# Patient Record
Sex: FEMALE | Race: WHITE | NOT HISPANIC OR LATINO | Employment: OTHER | URBAN - METROPOLITAN AREA
[De-identification: names, ages, dates, MRNs, and addresses within clinical notes are randomized per-mention and may not be internally consistent; named-entity substitution may affect disease eponyms.]

---

## 2018-02-15 ENCOUNTER — LAB REQUISITION (OUTPATIENT)
Dept: LAB | Facility: HOSPITAL | Age: 83
End: 2018-02-15

## 2018-02-15 DIAGNOSIS — Z00.00 ROUTINE GENERAL MEDICAL EXAMINATION AT A HEALTH CARE FACILITY: ICD-10-CM

## 2018-02-15 LAB
ANION GAP SERPL CALCULATED.3IONS-SCNC: 10.3 MMOL/L
BASOPHILS # BLD AUTO: 0.03 10*3/MM3 (ref 0–0.2)
BASOPHILS NFR BLD AUTO: 0.4 % (ref 0–1.5)
BUN BLD-MCNC: 26 MG/DL (ref 8–23)
BUN/CREAT SERPL: 25.2 (ref 7–25)
CALCIUM SPEC-SCNC: 8.2 MG/DL (ref 8.6–10.5)
CHLORIDE SERPL-SCNC: 99 MMOL/L (ref 98–107)
CO2 SERPL-SCNC: 27.7 MMOL/L (ref 22–29)
CREAT BLD-MCNC: 1.03 MG/DL (ref 0.57–1)
DEPRECATED RDW RBC AUTO: 56.5 FL (ref 37–54)
EOSINOPHIL # BLD AUTO: 0.18 10*3/MM3 (ref 0–0.7)
EOSINOPHIL NFR BLD AUTO: 2.1 % (ref 0.3–6.2)
ERYTHROCYTE [DISTWIDTH] IN BLOOD BY AUTOMATED COUNT: 14.8 % (ref 11.7–13)
GFR SERPL CREATININE-BSD FRML MDRD: 51 ML/MIN/1.73
GFR SERPL CREATININE-BSD FRML MDRD: 61 ML/MIN/1.73
GLUCOSE BLD-MCNC: 147 MG/DL (ref 65–99)
HCT VFR BLD AUTO: 32.2 % (ref 35.6–45.5)
HGB BLD-MCNC: 10.1 G/DL (ref 11.9–15.5)
IMM GRANULOCYTES # BLD: 0.02 10*3/MM3 (ref 0–0.03)
IMM GRANULOCYTES NFR BLD: 0.2 % (ref 0–0.5)
LYMPHOCYTES # BLD AUTO: 1.32 10*3/MM3 (ref 0.9–4.8)
LYMPHOCYTES NFR BLD AUTO: 15.5 % (ref 19.6–45.3)
MCH RBC QN AUTO: 32.9 PG (ref 26.9–32)
MCHC RBC AUTO-ENTMCNC: 31.4 G/DL (ref 32.4–36.3)
MCV RBC AUTO: 104.9 FL (ref 80.5–98.2)
MONOCYTES # BLD AUTO: 0.94 10*3/MM3 (ref 0.2–1.2)
MONOCYTES NFR BLD AUTO: 11.1 % (ref 5–12)
NEUTROPHILS # BLD AUTO: 6 10*3/MM3 (ref 1.9–8.1)
NEUTROPHILS NFR BLD AUTO: 70.7 % (ref 42.7–76)
PLATELET # BLD AUTO: 291 10*3/MM3 (ref 140–500)
PMV BLD AUTO: 10 FL (ref 6–12)
POTASSIUM BLD-SCNC: 5.1 MMOL/L (ref 3.5–5.2)
RBC # BLD AUTO: 3.07 10*6/MM3 (ref 3.9–5.2)
SODIUM BLD-SCNC: 137 MMOL/L (ref 136–145)
WBC NRBC COR # BLD: 8.49 10*3/MM3 (ref 4.5–10.7)

## 2018-02-15 PROCEDURE — 85025 COMPLETE CBC W/AUTO DIFF WBC: CPT

## 2018-02-15 PROCEDURE — 80048 BASIC METABOLIC PNL TOTAL CA: CPT

## 2019-08-12 ENCOUNTER — HOSPITAL ENCOUNTER (INPATIENT)
Facility: HOSPITAL | Age: 84
LOS: 4 days | Discharge: NURSING, REHAB OR LONG-TERM CARE | End: 2019-08-16
Attending: EMERGENCY MEDICINE | Admitting: HOSPITALIST

## 2019-08-12 ENCOUNTER — APPOINTMENT (OUTPATIENT)
Dept: GENERAL RADIOLOGY | Facility: HOSPITAL | Age: 84
End: 2019-08-12

## 2019-08-12 ENCOUNTER — APPOINTMENT (OUTPATIENT)
Dept: CT IMAGING | Facility: HOSPITAL | Age: 84
End: 2019-08-12

## 2019-08-12 DIAGNOSIS — S32.82XA MULTIPLE CLOSED FRACTURES OF PELVIS WITHOUT DISRUPTION OF PELVIC RING, INITIAL ENCOUNTER (HCC): Primary | ICD-10-CM

## 2019-08-12 DIAGNOSIS — R29.6 MULTIPLE FALLS: ICD-10-CM

## 2019-08-12 DIAGNOSIS — R62.7 FAILURE TO THRIVE IN ADULT: ICD-10-CM

## 2019-08-12 LAB
ABO GROUP BLD: NORMAL
ALBUMIN SERPL-MCNC: 2.9 G/DL (ref 3.5–5.2)
ALBUMIN/GLOB SERPL: 0.9 G/DL
ALP SERPL-CCNC: 109 U/L (ref 39–117)
ALT SERPL W P-5'-P-CCNC: 94 U/L (ref 1–33)
ANION GAP SERPL CALCULATED.3IONS-SCNC: 8.4 MMOL/L (ref 5–15)
AST SERPL-CCNC: 75 U/L (ref 1–32)
BASOPHILS # BLD AUTO: 0.03 10*3/MM3 (ref 0–0.2)
BASOPHILS NFR BLD AUTO: 0.3 % (ref 0–1.5)
BILIRUB SERPL-MCNC: 0.9 MG/DL (ref 0.2–1.2)
BLD GP AB SCN SERPL QL: NEGATIVE
BUN BLD-MCNC: 24 MG/DL (ref 8–23)
BUN/CREAT SERPL: 28.6 (ref 7–25)
CALCIUM SPEC-SCNC: 8.1 MG/DL (ref 8.6–10.5)
CHLORIDE SERPL-SCNC: 96 MMOL/L (ref 98–107)
CO2 SERPL-SCNC: 26.6 MMOL/L (ref 22–29)
CREAT BLD-MCNC: 0.84 MG/DL (ref 0.57–1)
DEPRECATED RDW RBC AUTO: 60.9 FL (ref 37–54)
EOSINOPHIL # BLD AUTO: 0.01 10*3/MM3 (ref 0–0.4)
EOSINOPHIL NFR BLD AUTO: 0.1 % (ref 0.3–6.2)
ERYTHROCYTE [DISTWIDTH] IN BLOOD BY AUTOMATED COUNT: 16 % (ref 12.3–15.4)
GFR SERPL CREATININE-BSD FRML MDRD: 64 ML/MIN/1.73
GLOBULIN UR ELPH-MCNC: 3.4 GM/DL
GLUCOSE BLD-MCNC: 87 MG/DL (ref 65–99)
HCT VFR BLD AUTO: 31.4 % (ref 34–46.6)
HGB BLD-MCNC: 9.8 G/DL (ref 12–15.9)
IMM GRANULOCYTES # BLD AUTO: 0.07 10*3/MM3 (ref 0–0.05)
IMM GRANULOCYTES NFR BLD AUTO: 0.6 % (ref 0–0.5)
INR PPP: 1.12 (ref 0.9–1.1)
LYMPHOCYTES # BLD AUTO: 0.57 10*3/MM3 (ref 0.7–3.1)
LYMPHOCYTES NFR BLD AUTO: 4.9 % (ref 19.6–45.3)
MCH RBC QN AUTO: 32.3 PG (ref 26.6–33)
MCHC RBC AUTO-ENTMCNC: 31.2 G/DL (ref 31.5–35.7)
MCV RBC AUTO: 103.6 FL (ref 79–97)
MONOCYTES # BLD AUTO: 1.18 10*3/MM3 (ref 0.1–0.9)
MONOCYTES NFR BLD AUTO: 10.1 % (ref 5–12)
NEUTROPHILS # BLD AUTO: 9.8 10*3/MM3 (ref 1.7–7)
NEUTROPHILS NFR BLD AUTO: 84 % (ref 42.7–76)
NRBC BLD AUTO-RTO: 0 /100 WBC (ref 0–0.2)
PLATELET # BLD AUTO: 291 10*3/MM3 (ref 140–450)
PMV BLD AUTO: 10.8 FL (ref 6–12)
POTASSIUM BLD-SCNC: 4.9 MMOL/L (ref 3.5–5.2)
PROT SERPL-MCNC: 6.3 G/DL (ref 6–8.5)
PROTHROMBIN TIME: 14.1 SECONDS (ref 11.7–14.2)
RBC # BLD AUTO: 3.03 10*6/MM3 (ref 3.77–5.28)
RH BLD: POSITIVE
SODIUM BLD-SCNC: 131 MMOL/L (ref 136–145)
T&S EXPIRATION DATE: NORMAL
WBC NRBC COR # BLD: 11.66 10*3/MM3 (ref 3.4–10.8)

## 2019-08-12 PROCEDURE — 71045 X-RAY EXAM CHEST 1 VIEW: CPT

## 2019-08-12 PROCEDURE — 73502 X-RAY EXAM HIP UNI 2-3 VIEWS: CPT

## 2019-08-12 PROCEDURE — 99284 EMERGENCY DEPT VISIT MOD MDM: CPT

## 2019-08-12 PROCEDURE — 70450 CT HEAD/BRAIN W/O DYE: CPT

## 2019-08-12 PROCEDURE — 80053 COMPREHEN METABOLIC PANEL: CPT | Performed by: EMERGENCY MEDICINE

## 2019-08-12 PROCEDURE — 86850 RBC ANTIBODY SCREEN: CPT | Performed by: EMERGENCY MEDICINE

## 2019-08-12 PROCEDURE — 86900 BLOOD TYPING SEROLOGIC ABO: CPT | Performed by: EMERGENCY MEDICINE

## 2019-08-12 PROCEDURE — 85610 PROTHROMBIN TIME: CPT | Performed by: EMERGENCY MEDICINE

## 2019-08-12 PROCEDURE — 86901 BLOOD TYPING SEROLOGIC RH(D): CPT | Performed by: EMERGENCY MEDICINE

## 2019-08-12 PROCEDURE — 85025 COMPLETE CBC W/AUTO DIFF WBC: CPT | Performed by: EMERGENCY MEDICINE

## 2019-08-12 RX ORDER — SODIUM CHLORIDE 0.9 % (FLUSH) 0.9 %
10 SYRINGE (ML) INJECTION AS NEEDED
Status: DISCONTINUED | OUTPATIENT
Start: 2019-08-12 | End: 2019-08-16 | Stop reason: HOSPADM

## 2019-08-13 PROBLEM — S32.9XXA PELVIC FRACTURE (HCC): Status: ACTIVE | Noted: 2019-08-13

## 2019-08-13 LAB
BACTERIA UR QL AUTO: ABNORMAL /HPF
BILIRUB UR QL STRIP: NEGATIVE
CLARITY UR: ABNORMAL
COLOR UR: YELLOW
GLUCOSE UR STRIP-MCNC: NEGATIVE MG/DL
HGB UR QL STRIP.AUTO: NEGATIVE
HYALINE CASTS UR QL AUTO: ABNORMAL /LPF
KETONES UR QL STRIP: ABNORMAL
LEUKOCYTE ESTERASE UR QL STRIP.AUTO: ABNORMAL
NITRITE UR QL STRIP: NEGATIVE
PH UR STRIP.AUTO: 8.5 [PH] (ref 5–8)
PROT UR QL STRIP: ABNORMAL
RBC # UR: ABNORMAL /HPF
REF LAB TEST METHOD: ABNORMAL
SP GR UR STRIP: 1.02 (ref 1–1.03)
SQUAMOUS #/AREA URNS HPF: ABNORMAL /HPF
UROBILINOGEN UR QL STRIP: ABNORMAL
WBC UR QL AUTO: ABNORMAL /HPF

## 2019-08-13 PROCEDURE — 81001 URINALYSIS AUTO W/SCOPE: CPT | Performed by: EMERGENCY MEDICINE

## 2019-08-13 PROCEDURE — 3E0G76Z INTRODUCTION OF NUTRITIONAL SUBSTANCE INTO UPPER GI, VIA NATURAL OR ARTIFICIAL OPENING: ICD-10-PCS | Performed by: HOSPITALIST

## 2019-08-13 PROCEDURE — G0378 HOSPITAL OBSERVATION PER HR: HCPCS

## 2019-08-13 PROCEDURE — 25010000002 HYDROMORPHONE PER 4 MG: Performed by: HOSPITALIST

## 2019-08-13 PROCEDURE — 92610 EVALUATE SWALLOWING FUNCTION: CPT

## 2019-08-13 RX ORDER — FLUOXETINE HYDROCHLORIDE 20 MG/1
20 CAPSULE ORAL DAILY
Status: DISCONTINUED | OUTPATIENT
Start: 2019-08-13 | End: 2019-08-16 | Stop reason: HOSPADM

## 2019-08-13 RX ORDER — NITROGLYCERIN 0.4 MG/1
0.4 TABLET SUBLINGUAL
COMMUNITY
End: 2019-08-16 | Stop reason: HOSPADM

## 2019-08-13 RX ORDER — FUROSEMIDE 20 MG/1
20 TABLET ORAL DAILY
Status: DISCONTINUED | OUTPATIENT
Start: 2019-08-13 | End: 2019-08-13

## 2019-08-13 RX ORDER — LEVOTHYROXINE SODIUM 0.07 MG/1
75 TABLET ORAL DAILY
Status: DISCONTINUED | OUTPATIENT
Start: 2019-08-13 | End: 2019-08-16 | Stop reason: HOSPADM

## 2019-08-13 RX ORDER — ALBUTEROL SULFATE 90 UG/1
2 AEROSOL, METERED RESPIRATORY (INHALATION) EVERY 6 HOURS PRN
COMMUNITY

## 2019-08-13 RX ORDER — ISOSORBIDE MONONITRATE 30 MG/1
30 TABLET, EXTENDED RELEASE ORAL DAILY
Status: DISCONTINUED | OUTPATIENT
Start: 2019-08-13 | End: 2019-08-13

## 2019-08-13 RX ORDER — AMITRIPTYLINE HYDROCHLORIDE 10 MG/1
10 TABLET, FILM COATED ORAL NIGHTLY
Status: DISCONTINUED | OUTPATIENT
Start: 2019-08-13 | End: 2019-08-16 | Stop reason: HOSPADM

## 2019-08-13 RX ORDER — PANTOPRAZOLE SODIUM 40 MG/1
40 TABLET, DELAYED RELEASE ORAL DAILY
Status: DISCONTINUED | OUTPATIENT
Start: 2019-08-13 | End: 2019-08-16 | Stop reason: HOSPADM

## 2019-08-13 RX ORDER — FUROSEMIDE 20 MG/1
20 TABLET ORAL DAILY
COMMUNITY
End: 2019-08-16 | Stop reason: HOSPADM

## 2019-08-13 RX ORDER — SODIUM CHLORIDE 9 MG/ML
75 INJECTION, SOLUTION INTRAVENOUS CONTINUOUS
Status: DISCONTINUED | OUTPATIENT
Start: 2019-08-13 | End: 2019-08-14

## 2019-08-13 RX ORDER — HYDROMORPHONE HYDROCHLORIDE 1 MG/ML
0.5 INJECTION, SOLUTION INTRAMUSCULAR; INTRAVENOUS; SUBCUTANEOUS EVERY 4 HOURS PRN
Status: DISCONTINUED | OUTPATIENT
Start: 2019-08-13 | End: 2019-08-13

## 2019-08-13 RX ORDER — BUSPIRONE HYDROCHLORIDE 10 MG/1
10 TABLET ORAL 2 TIMES DAILY
Status: DISCONTINUED | OUTPATIENT
Start: 2019-08-13 | End: 2019-08-13

## 2019-08-13 RX ORDER — BUSPIRONE HYDROCHLORIDE 10 MG/1
10 TABLET ORAL 3 TIMES DAILY PRN
Status: DISCONTINUED | OUTPATIENT
Start: 2019-08-13 | End: 2019-08-16 | Stop reason: HOSPADM

## 2019-08-13 RX ORDER — BUSPIRONE HYDROCHLORIDE 10 MG/1
10 TABLET ORAL 2 TIMES DAILY
COMMUNITY

## 2019-08-13 RX ORDER — OXYCODONE HCL 20 MG/ML
5 CONCENTRATE, ORAL ORAL EVERY 4 HOURS PRN
Status: DISCONTINUED | OUTPATIENT
Start: 2019-08-13 | End: 2019-08-13

## 2019-08-13 RX ORDER — ALBUTEROL SULFATE 2.5 MG/3ML
2.5 SOLUTION RESPIRATORY (INHALATION) EVERY 6 HOURS PRN
Status: DISCONTINUED | OUTPATIENT
Start: 2019-08-13 | End: 2019-08-16 | Stop reason: HOSPADM

## 2019-08-13 RX ORDER — ASPIRIN 81 MG/1
81 TABLET ORAL DAILY
Status: DISCONTINUED | OUTPATIENT
Start: 2019-08-13 | End: 2019-08-16 | Stop reason: HOSPADM

## 2019-08-13 RX ORDER — FLUOXETINE HYDROCHLORIDE 20 MG/1
20 CAPSULE ORAL DAILY
COMMUNITY

## 2019-08-13 RX ORDER — AMITRIPTYLINE HYDROCHLORIDE 10 MG/1
10 TABLET, FILM COATED ORAL NIGHTLY
COMMUNITY

## 2019-08-13 RX ORDER — LEVOTHYROXINE SODIUM 0.07 MG/1
75 TABLET ORAL DAILY
COMMUNITY

## 2019-08-13 RX ORDER — OXYCODONE HCL 5 MG/5 ML
5 SOLUTION, ORAL ORAL EVERY 4 HOURS PRN
Status: DISCONTINUED | OUTPATIENT
Start: 2019-08-13 | End: 2019-08-16 | Stop reason: HOSPADM

## 2019-08-13 RX ADMIN — HYDROMORPHONE HYDROCHLORIDE 0.5 MG: 1 INJECTION, SOLUTION INTRAMUSCULAR; INTRAVENOUS; SUBCUTANEOUS at 03:49

## 2019-08-13 RX ADMIN — ASPIRIN 81 MG: 81 TABLET, COATED ORAL at 15:27

## 2019-08-13 RX ADMIN — PANTOPRAZOLE SODIUM 40 MG: 40 TABLET, DELAYED RELEASE ORAL at 15:30

## 2019-08-13 RX ADMIN — FLUOXETINE HYDROCHLORIDE 20 MG: 20 CAPSULE ORAL at 15:26

## 2019-08-13 RX ADMIN — METOPROLOL TARTRATE 12.5 MG: 25 TABLET ORAL at 15:26

## 2019-08-13 RX ADMIN — LEVOTHYROXINE SODIUM 75 MCG: 75 TABLET ORAL at 15:26

## 2019-08-13 RX ADMIN — AMITRIPTYLINE HYDROCHLORIDE 10 MG: 10 TABLET, FILM COATED ORAL at 20:16

## 2019-08-13 RX ADMIN — HYDROMORPHONE HYDROCHLORIDE 0.5 MG: 1 INJECTION, SOLUTION INTRAMUSCULAR; INTRAVENOUS; SUBCUTANEOUS at 11:52

## 2019-08-13 RX ADMIN — METOPROLOL TARTRATE 12.5 MG: 25 TABLET ORAL at 20:16

## 2019-08-13 RX ADMIN — SODIUM CHLORIDE 75 ML/HR: 9 INJECTION, SOLUTION INTRAVENOUS at 19:18

## 2019-08-13 RX ADMIN — OXYCODONE HYDROCHLORIDE 5 MG: 5 SOLUTION ORAL at 20:16

## 2019-08-13 NOTE — NURSING NOTE
"Explained to patient that a urine sample is needed and explained in and out cath. Pt stated \" I will wait till later and then I might \".  "

## 2019-08-13 NOTE — PROGRESS NOTES
Continued Stay Note  [facility]     Patient Name: Mary Lindsey  MRN: [id number]  Today's Date: 8/13/2019    Admit Date: 8/12/2019    Discharge Plan     Row Name 08/13/19 1546       Plan    Plan Comments  Attempted calling pts daughter to discuss d/c planning. No answer, CCP to f/u 8/14.        Discharge Codes    No documentation.             Mckayla Foss RN

## 2019-08-13 NOTE — ED TRIAGE NOTES
To ER via EMS.  Fall at home.  Frequent falls frequently.  C/o rt hip pain. Hit back of head during fall.  No obvious injury to head.  Rt leg is shortened and slightly externally rotated.  + pulses.

## 2019-08-13 NOTE — PLAN OF CARE
"Problem: Patient Care Overview  Goal: Plan of Care Review   08/13/19 8557   Coping/Psychosocial   Plan of Care Reviewed With patient   OTHER   Outcome Summary Order received due to \"failed RN swallow screen\". Patient reportedly on PEG tube feedings and clear liquids at home, patient has clear liquid diet order. Trialed only clear liquids, no difficulty noted. May continue clear liquid diet, precautions - as upright as possible, meds through PEG tube. Speech to sign off at this time.         "

## 2019-08-13 NOTE — H&P
History and physical    Primary care physician  Dr. Justin    Chief complaint  Status post fall  Bilateral hip and pelvic pain    History of present illness  88-year-old white female with history of COPD CHF coronary artery disease hypertension hyperlipidemia hypothyroidism also have dementia with severe osteoarthritis osteoporosis who was a nursing home resident brought to the emergency room after mechanical fall yesterday with bilateral hip pain and pelvic pain.  Patient stated that she has been falling a lot lately without losing consciousness but mainly she lost her balance and fell.  Patient denies any chest pain increased shortness of breath dizziness.  Patient hurting more on the left hip than the right.  Patient work-up in ER revealed multiple pelvic fractures admit for management    PAST MEDICAL HISTORY  • Arthritis     • CHF (congestive heart failure) (CMS/[facility])     • COPD (chronic obstructive pulmonary disease) (CMS/[facility])     • Coronary artery disease     • Dementia     • Depression     • Disease of thyroid gland     • GERD (gastroesophageal reflux disease)     • Hyperlipidemia     • Hypertension     • Pneumonia        PAST SURGICAL HISTORY  Surgical History         Past Surgical History:   Procedure Laterality Date   • CARDIAC SURGERY         stents x 2   • GTUBE INSERTION       • JOINT REPLACEMENT Bilateral       hip   • NASAL SINUS SURGERY             FAMILY HISTORY  History reviewed. No pertinent family history.     SOCIAL HISTORY  Social History   Social History            Socioeconomic History   • Marital status:        Spouse name: Not on file   • Number of children: Not on file   • Years of education: Not on file   • Highest education level: Not on file   Tobacco Use   • Smoking status: Never Smoker   • Smokeless tobacco: Never Used   Substance and Sexual Activity   • Alcohol use: No       Frequency: Never   • Drug use: No         ALLERGIES  Patient has no known allergies.  Nursing Home  "medications reviewed     REVIEW OF SYSTEMS  Poor historian  Constitutional: Positive: fall   Musculoskeletal: Positive for arthralgias (bilateral hip pain).      PHYSICAL EXAM  Blood pressure 152/61, pulse 67, temperature 97.8 °F (36.6 °C), temperature source Oral, resp. rate 16, height 175.3 cm (69\"), weight 48 kg (105 lb 13.1 oz), SpO2 91 %.    GENERAL: Chronically ill-appearing, disheveled with multiple bruises on her extremities  HENT: NCAT, neck supple, trachea midline  EYES: no scleral icterus, PERRL, normal conjunctiva  CV: regular rhythm, regular rate, no murmur  RESPIRATORY: unlabored effort, CTAB  ABDOMEN: soft, non-tender, non-distended, bowel sounds present.  There is a G-tube present with no signs of infection or leakage.  MUSCULOSKELETAL: no gross deformity, TTP mild over R hip, slight pain with internal and external rotation.patient was unable to ambulate on her own even with a walker.  The pain in the right hip contributed to some unstable weightbearing and she had to be supported fully by one other person   nEURO: alert,  sensory and motor function of extremities grossly intact, speech clear, mental status normal/baseline. Pt is oriented but inaccurate with some answers and repeatative  SKIN: warm, dry, no rash, multiple bruising to the BLE.   PSYCH:  Appropriate mood and affect    LAB RESULTS  Lab Results (last 24 hours)     Procedure Component Value Units Date/Time    Comprehensive Metabolic Panel [[id number]]  (Abnormal) Collected:  08/12/19 2209    Specimen:  Blood Updated:  08/12/19 2256     Glucose 87 mg/dL      BUN 24 mg/dL      Creatinine 0.84 mg/dL      Sodium 131 mmol/L      Potassium 4.9 mmol/L      Comment: Specimen hemolyzed.  Results may be affected.        Chloride 96 mmol/L      CO2 26.6 mmol/L      Calcium 8.1 mg/dL      Total Protein 6.3 g/dL      Albumin 2.90 g/dL      ALT (SGPT) 94 U/L      Comment: Specimen hemolyzed.  Results may be affected.        AST (SGOT) 75 U/L      " Comment: Specimen hemolyzed.  Results may be affected.        Alkaline Phosphatase 109 U/L      Total Bilirubin 0.9 mg/dL      eGFR Non African Amer 64 mL/min/1.73      Globulin 3.4 gm/dL      A/G Ratio 0.9 g/dL      BUN/Creatinine Ratio 28.6     Anion Gap 8.4 mmol/L     Narrative:       GFR Normal >60  Chronic Kidney Disease <60  Kidney Failure <15    Protime-INR [[id number]]  (Abnormal) Collected:  08/12/19 2209    Specimen:  Blood Updated:  08/12/19 2242     Protime 14.1 Seconds      INR 1.12    CBC & Differential [[id number]] Collected:  08/12/19 2208    Specimen:  Blood Updated:  08/12/19 2240    Narrative:       The following orders were created for panel order CBC & Differential.  Procedure                               Abnormality         Status                     ---------                               -----------         ------                     CBC Auto Differential[[id number]]        Abnormal            Final result                 Please view results for these tests on the individual orders.    CBC Auto Differential [[id number]]  (Abnormal) Collected:  08/12/19 2208    Specimen:  Blood Updated:  08/12/19 2240     WBC 11.66 10*3/mm3      RBC 3.03 10*6/mm3      Hemoglobin 9.8 g/dL      Hematocrit 31.4 %      .6 fL      MCH 32.3 pg      MCHC 31.2 g/dL      RDW 16.0 %      RDW-SD 60.9 fl      MPV 10.8 fL      Platelets 291 10*3/mm3      Neutrophil % 84.0 %      Lymphocyte % 4.9 %      Monocyte % 10.1 %      Eosinophil % 0.1 %      Basophil % 0.3 %      Immature Grans % 0.6 %      Neutrophils, Absolute 9.80 10*3/mm3      Lymphocytes, Absolute 0.57 10*3/mm3      Monocytes, Absolute 1.18 10*3/mm3      Eosinophils, Absolute 0.01 10*3/mm3      Basophils, Absolute 0.03 10*3/mm3      Immature Grans, Absolute 0.07 10*3/mm3      nRBC 0.0 /100 WBC         Imaging Results (last 24 hours)     Procedure Component Value Units Date/Time    CT Head Without Contrast [[id number]] Collected:  08/12/19 2235     Updated:   08/12/19 2240    Narrative:       CRANIAL CT SCAN WITHOUT CONTRAST     CLINICAL HISTORY: chi     COMPARISON: None.     TECHNIQUE: Radiation dose reduction techniques were utilized, including  automated exposure control and exposure modulation based on body size.  Multiple axial images of the head were obtained without contrast.      FINDINGS:  There are no abnormal areas of increased density or mass  effect. Advanced atrophy. There are mild scattered areas of decreased  density in the white matter likely related to chronic ischemic gliotic  changes.   Ventricles, sulci, and cisterns appear normal. Bone window  images are unremarkable.                Impression:       1. No acute intracranial abnormality.                 This report was finalized on 8/12/2019 10:36 PM by Shailesh Alfaro M.D.       XR Hip With or Without Pelvis 2 - 3 View Right [[id number]] Collected:  08/12/19 2148     Updated:  08/12/19 2154    Narrative:       Pelvis and right hip x-rays     HISTORY: Fall, right hip pain.     TECHNIQUE: An AP view the pelvis, two images of the right hip and an AP  view of the left hip are provided and are correlated with pelvis and  right hip x-rays from 06/16/2016.     FINDINGS: There our bilateral, bipolar hip arthroplasties. There is  cortical irregularity along the superior aspect of the right pubic bone,  the midportion of the right inferior ischiopubic ramus, and the mid  inferior left ischiopubic ramus. These are consistent with fractures of  unknown acuity. The sacrum is partly obscured by fecal material and  bowel gas. No periprosthetic femur fracture is present. There is  deformity at the left femoral lesser trochanter, compatible with an old  injury to this location. At the upper edge of the field-of-view,  advanced degenerative change in the lumbar spine with scoliotic  curvature is observed along with a G-tube.       Impression:       Bipolar hip arthroplasties bilaterally. Cortical  irregularity of the  ischio pubic rami bilaterally are compatible with  pelvic fractures although their exact age is not ascertained. These are  new since 2006. There is no evidence of femur fracture.     This report was finalized on 8/12/2019 9:51 PM by Dr. Brendan Stewart M.D.       XR Chest 1 View [[id number]] Collected:  08/12/19 2146     Updated:  08/12/19 2151    Narrative:       PORTABLE CHEST X-RAY     HISTORY: fall     COMPARISON: None.     FINDINGS: Portable AP view of the chest was obtained. The lungs are well  inflated. There are some scattered fibrotic appearing opacities which  are favored to be chronic. Linear densities in the left lower lobe most  suggestive of scarring or atelectasis. There is no convincing evidence  of active air space disease process otherwise. No edema or pleural  fluid. Borderline cardiomegaly. Mild aortic ectasia. There are multiple  old appearing bilateral rib deformities.             Impression:       Chronic-appearing parenchymal changes with some linear scar  and/or atelectasis in the left lower lobe.     This report was finalized on 8/12/2019 9:48 PM by Shailesh Alfaro M.D.             Current Facility-Administered Medications:   •  albuterol (PROVENTIL) nebulizer solution 0.083% 2.5 mg/3mL, 2.5 mg, Nebulization, Q6H PRN, Melecio Levi MD  •  amitriptyline (ELAVIL) tablet 10 mg, 10 mg, Per G Tube, Nightly, Melecio Levi MD  •  aspirin EC tablet 81 mg, 81 mg, Oral, Daily, Melecio Levi MD  •  busPIRone (BUSPAR) tablet 10 mg, 10 mg, Per G Tube, TID PRN, Melecio Levi MD  •  FLUoxetine (PROzac) capsule 20 mg, 20 mg, Per G Tube, Daily, Melecio Levi MD  •  furosemide (LASIX) tablet 20 mg, 20 mg, Per G Tube, Daily, Melecio Levi MD  •  HYDROmorphone (DILAUDID) injection 0.5 mg, 0.5 mg, Intravenous, Q4H PRN, Melecio Levi MD, 0.5 mg at 08/13/19 1152  •  isosorbide mononitrate (IMDUR) 24 hr tablet 30 mg, 30 mg, Oral, Daily, Melecio Levi MD  •  levothyroxine (SYNTHROID, LEVOTHROID) tablet 75 mcg, 75  mcg, Per G Tube, Daily, Mitchell Prieto MD  •  metoprolol tartrate (LOPRESSOR) tablet 12.5 mg, 12.5 mg, Per G Tube, Q12H, Mitchell Prieto MD  •  oxyCODONE (ROXICODONE) 100 MG/5ML concentrated solution 5 mg, 5 mg, Oral, Q4H PRN, Mitchell Prieto MD  •  pantoprazole (PROTONIX) EC tablet 40 mg, 40 mg, Oral, Daily, Mitchell Prieto MD  •  [COMPLETED] Insert peripheral IV, , , Once **AND** sodium chloride 0.9 % flush 10 mL, 10 mL, Intravenous, PRN, Darius Esteves MD     ASSESSMENT  Multiple pelvic fractures  Status post fall  Bilateral hip pain with endoprosthesis in the past  COPD  Congestive heart failure  Coronary artery disease  Anxiety disorder  Depression  Dementia  Hypertension   Hyperlipidemia  Hypothyroidism  Osteoarthritis  Osteoporosis    PLAN  Admit   Bedrest  Pain management  Orthopedic surgery consult  Continue nursing home medications and adjust the doses  Stress ulcer DVT prophylaxis  Supportive care  DNR  PT OT  Follow closely and further recommendations according to hospital course    MITCHELL PRIETO MD

## 2019-08-13 NOTE — THERAPY EVALUATION
Acute Care - Speech Language Pathology   Swallow Initial Evaluation [facility]     Patient Name: Mary Lindsey  : 1930  MRN: [id number]  Today's Date: 2019               Admit Date: 2019    Visit Dx:     ICD-10-CM ICD-9-CM   1. Multiple closed fractures of pelvis without disruption of pelvic ring, initial encounter (CMS/[facility]) S32.82XA 808.44   2. Failure to thrive in adult R62.7 783.7   3. Multiple falls R29.6 V15.88     Patient Active Problem List   Diagnosis   • Pelvic fracture (CMS/[facility])     Past Medical History:   Diagnosis Date   • Arthritis    • CHF (congestive heart failure) (CMS/[facility])    • COPD (chronic obstructive pulmonary disease) (CMS/[facility])    • Coronary artery disease    • Dementia    • Depression    • Disease of thyroid gland    • GERD (gastroesophageal reflux disease)    • Hyperlipidemia    • Hypertension    • Pneumonia     aspiration     Past Surgical History:   Procedure Laterality Date   • CARDIAC SURGERY      stents x 2   • GTUBE INSERTION     • JOINT REPLACEMENT Bilateral     hip   • NASAL SINUS SURGERY          SWALLOW EVALUATION (last 72 hours)      SLP Adult Swallow Evaluation     Row Name 19 1000                   Rehab Evaluation    Document Type  evaluation  -MT        Subjective Information  no complaints  -MT        Patient Observations  alert;cooperative  -MT        Patient Effort  adequate  -MT           General Information    Patient Profile Reviewed  yes  -MT        Pertinent History Of Current Problem  fall, hip pain, failure to thrive. On PEG, per daughter receives PEG tube feedings and clear liquids (as reported in the chart).   -MT        Current Method of Nutrition  gastrostomy feedings;thin liquids  -MT        Precautions/Limitations, Vision  WFL;for purposes of eval  -MT        Precautions/Limitations, Hearing  WFL;for purposes of eval  -MT        Prior Level of Function-Communication  cognitive-linguistic impairment  -MT        Prior Level of  Function-Swallowing  alternative feeding method;thin liquids  -MT        Plans/Goals Discussed with  patient;other (see comments) family not available  -MT        Barriers to Rehab  none identified  -MT        Patient's Goals for Discharge  patient could not state  -MT           Oral Musculature and Cranial Nerve Assessment    Oral Motor General Assessment  WFL  -MT           General Eating/Swallowing Observations    Respiratory Support Currently in Use  room air  -MT        Eating/Swallowing Skills  self-fed  -MT        Positioning During Eating  upright in bed;semi-reclined  -MT        Utensils Used  cup  -MT        Consistencies Trialed  thin liquids  -MT           Clinical Swallow Eval    Clinical Swallow Evaluation Summary  Unknown why patient is on clear liquids, no documentation in chart except statement that daughter reports PEG feeding and clear liquids. Trialed clear liquids only. Some discoordination noted as pateint was not fully upright due to hip pain, but no overt signs of dysphagia with clear liquids.   -MT           Clinical Impression    SLP Swallowing Diagnosis  functional oral phase;functional pharyngeal phase;other (see comments) only trialed clear liquids  -MT        Functional Impact  no impact on function  -MT        Rehab Potential/Prognosis, Swallowing  good, to achieve stated therapy goals  -MT        Swallow Criteria for Skilled Therapeutic Interventions Met  no problems identified which require skilled intervention  -MT           Recommendations    Therapy Frequency (Swallow)  evaluation only  -MT        SLP Diet Recommendation  other (see comments) continue diet as ordered  -MT        Recommended Precautions and Strategies  upright posture during/after eating  -MT        SLP Rec. for Method of Medication Administration  meds via alternate route  -MT        Monitor for Signs of Aspiration  yes;notify SLP if any concerns  -MT        Anticipated Dischage Disposition  extended care facility  " -MT          User Key  (r) = Recorded By, (t) = Taken By, (c) = Cosigned By    Initials Name Effective Dates    Mariangel Roldan MS CCC-SLP 06/08/18 -           EDUCATION  The patient has been educated in the following areas:   Modified Diet Instruction.    SLP Recommendation and Plan  SLP Swallowing Diagnosis: functional oral phase, functional pharyngeal phase, other (see comments)(only trialed clear liquids)  SLP Diet Recommendation: other (see comments)(continue diet as ordered)  Recommended Precautions and Strategies: upright posture during/after eating  SLP Rec. for Method of Medication Administration: meds via alternate route     Monitor for Signs of Aspiration: yes, notify SLP if any concerns     Swallow Criteria for Skilled Therapeutic Interventions Met: no problems identified which require skilled intervention  Anticipated Dischage Disposition: extended care facility  Rehab Potential/Prognosis, Swallowing: good, to achieve stated therapy goals  Therapy Frequency (Swallow): evaluation only     Plan of Care Reviewed With: patient  Plan of Care Review  Plan of Care Reviewed With: patient  Outcome Summary: Order received due to \"failed RN swallow screen\". Patient reportedly on PEG tube feedings and clear liquids at home, patient has clear liquid diet order. Trialed only clear liquids, no difficulty noted. May continue clear liquid diet, precautions - as upright as possible, meds through PEG tube. Speech to sign off at this time.         SLP Outcome Measures (last 72 hours)      SLP Outcome Measures     Row Name 08/13/19 1000             SLP Outcome Measures    Outcome Measure Used?  Adult NOMS  -MT         Adult FCM Scores    FCM Chosen  Swallowing  -MT      Swallowing FCM Score  3  -MT        User Key  (r) = Recorded By, (t) = Taken By, (c) = Cosigned By    Initials Name Effective Dates    Mariangel Roldan MS CCC-SLP 06/08/18 -            Time Calculation:   Time Calculation- SLP     Row Name 08/13/19 " 1049             Time Calculation- SLP    SLP Start Time  1030  -MT      SLP Stop Time  1050  -MT      SLP Time Calculation (min)  20 min  -MT      SLP Received On  08/13/19  -MT        User Key  (r) = Recorded By, (t) = Taken By, (c) = Cosigned By    Initials Name Provider Type    Mariangel Roldan MS CCC-SLP Speech and Language Pathologist          Therapy Charges for Today     Code Description Service Date Service Provider Modifiers Qty    [id number] HC ST EVAL ORAL PHARYNG SWALLOW 1 8/13/2019 Mariangel Obrien MS CCC-SLP GN 1               Mariangel Obrien MS CCC-AUDNREA  8/13/2019

## 2019-08-13 NOTE — ED PROVIDER NOTES
EMERGENCY DEPARTMENT ENCOUNTER    Room Number:  19/19  Date of encounter:  8/13/2019  PCP: Provider, No Known  Historian: Patient      HPI:  Chief Complaint: Fall  A complete HPI/ROS/PMH/PSH/SH/FH are unobtainable due to: poor historian    Context: Mary Lindsey is a 88 y.o. female who presents to the ED c/o a mechanical fall earlier this evening. Pt admits to bilateral hip pain. Pt states that she has been falling a lot and associates it with losing her balance. She reports that she was not using her walker to ambulate when she fell earlier today. She says that she did spend a lot of time on the floor when she fell today as she does not live in a NH but with her daughter. Pt reports hx of RA.      PAST MEDICAL HISTORY  Active Ambulatory Problems     Diagnosis Date Noted   • No Active Ambulatory Problems     Resolved Ambulatory Problems     Diagnosis Date Noted   • No Resolved Ambulatory Problems     Past Medical History:   Diagnosis Date   • Arthritis    • CHF (congestive heart failure) (CMS/[facility])    • COPD (chronic obstructive pulmonary disease) (CMS/[facility])    • Coronary artery disease    • Dementia    • Depression    • Disease of thyroid gland    • GERD (gastroesophageal reflux disease)    • Hyperlipidemia    • Hypertension    • Pneumonia          PAST SURGICAL HISTORY  Past Surgical History:   Procedure Laterality Date   • CARDIAC SURGERY      stents x 2   • GTUBE INSERTION     • JOINT REPLACEMENT Bilateral     hip   • NASAL SINUS SURGERY           FAMILY HISTORY  History reviewed. No pertinent family history.      SOCIAL HISTORY  Social History     Socioeconomic History   • Marital status:      Spouse name: Not on file   • Number of children: Not on file   • Years of education: Not on file   • Highest education level: Not on file   Tobacco Use   • Smoking status: Never Smoker   • Smokeless tobacco: Never Used   Substance and Sexual Activity   • Alcohol use: No     Frequency: Never   • Drug use: No          ALLERGIES  Patient has no known allergies.        REVIEW OF SYSTEMS  Review of Systems   Unable to perform ROS: Other (Poor historian)   Constitutional:        Positive: fall   Musculoskeletal: Positive for arthralgias (bilateral hip pain).            PHYSICAL EXAM    I have reviewed the triage vital signs and nursing notes.    ED Triage Vitals [08/12/19 2028]   Temp Heart Rate Resp BP SpO2   98.1 °F (36.7 °C) 60 19 158/68 91 %      Temp src Heart Rate Source Patient Position BP Location FiO2 (%)   -- -- -- -- --       GENERAL: Chronically ill-appearing, disheveled with multiple bruises on her extremities  HENT: NCAT, neck supple, trachea midline  EYES: no scleral icterus, PERRL, normal conjunctiva  CV: regular rhythm, regular rate, no murmur  RESPIRATORY: unlabored effort, CTAB  ABDOMEN: soft, non-tender, non-distended, bowel sounds present.  There is a G-tube present with no signs of infection or leakage.  MUSCULOSKELETAL: no gross deformity, TTP mild over R hip, slight pain with internal and external rotation.patient was unable to ambulate on her own even with a walker.  The pain in the right hip contributed to some unstable weightbearing and she had to be supported fully by one other person   nEURO: alert,  sensory and motor function of extremities grossly intact, speech clear, mental status normal/baseline. Pt is oriented but inaccurate with some answers and repeatative  SKIN: warm, dry, no rash, multiple bruising to the BLE.   PSYCH:  Appropriate mood and affect    Vital signs and nursing notes reviewed.          LAB RESULTS  Recent Results (from the past 24 hour(s))   CBC Auto Differential    Collection Time: 08/12/19 10:08 PM   Result Value Ref Range    WBC 11.66 (H) 3.40 - 10.80 10*3/mm3    RBC 3.03 (L) 3.77 - 5.28 10*6/mm3    Hemoglobin 9.8 (L) 12.0 - 15.9 g/dL    Hematocrit 31.4 (L) 34.0 - 46.6 %    .6 (H) 79.0 - 97.0 fL    MCH 32.3 26.6 - 33.0 pg    MCHC 31.2 (L) 31.5 - 35.7 g/dL    RDW  16.0 (H) 12.3 - 15.4 %    RDW-SD 60.9 (H) 37.0 - 54.0 fl    MPV 10.8 6.0 - 12.0 fL    Platelets 291 140 - 450 10*3/mm3    Neutrophil % 84.0 (H) 42.7 - 76.0 %    Lymphocyte % 4.9 (L) 19.6 - 45.3 %    Monocyte % 10.1 5.0 - 12.0 %    Eosinophil % 0.1 (L) 0.3 - 6.2 %    Basophil % 0.3 0.0 - 1.5 %    Immature Grans % 0.6 (H) 0.0 - 0.5 %    Neutrophils, Absolute 9.80 (H) 1.70 - 7.00 10*3/mm3    Lymphocytes, Absolute 0.57 (L) 0.70 - 3.10 10*3/mm3    Monocytes, Absolute 1.18 (H) 0.10 - 0.90 10*3/mm3    Eosinophils, Absolute 0.01 0.00 - 0.40 10*3/mm3    Basophils, Absolute 0.03 0.00 - 0.20 10*3/mm3    Immature Grans, Absolute 0.07 (H) 0.00 - 0.05 10*3/mm3    nRBC 0.0 0.0 - 0.2 /100 WBC   Comprehensive Metabolic Panel    Collection Time: 08/12/19 10:09 PM   Result Value Ref Range    Glucose 87 65 - 99 mg/dL    BUN 24 (H) 8 - 23 mg/dL    Creatinine 0.84 0.57 - 1.00 mg/dL    Sodium 131 (L) 136 - 145 mmol/L    Potassium 4.9 3.5 - 5.2 mmol/L    Chloride 96 (L) 98 - 107 mmol/L    CO2 26.6 22.0 - 29.0 mmol/L    Calcium 8.1 (L) 8.6 - 10.5 mg/dL    Total Protein 6.3 6.0 - 8.5 g/dL    Albumin 2.90 (L) 3.50 - 5.20 g/dL    ALT (SGPT) 94 (H) 1 - 33 U/L    AST (SGOT) 75 (H) 1 - 32 U/L    Alkaline Phosphatase 109 39 - 117 U/L    Total Bilirubin 0.9 0.2 - 1.2 mg/dL    eGFR Non African Amer 64 >60 mL/min/1.73    Globulin 3.4 gm/dL    A/G Ratio 0.9 g/dL    BUN/Creatinine Ratio 28.6 (H) 7.0 - 25.0    Anion Gap 8.4 5.0 - 15.0 mmol/L   Protime-INR    Collection Time: 08/12/19 10:09 PM   Result Value Ref Range    Protime 14.1 11.7 - 14.2 Seconds    INR 1.12 (H) 0.90 - 1.10   Type & Screen    Collection Time: 08/12/19 10:09 PM   Result Value Ref Range    ABO Type A     RH type Positive     Antibody Screen Negative     T&S Expiration Date 8/15/2019 11:59:59 PM        Ordered the above labs and independently reviewed the results.        RADIOLOGY  Ct Head Without Contrast    Result Date: 8/12/2019  CRANIAL CT SCAN WITHOUT CONTRAST  CLINICAL HISTORY:  chi  COMPARISON: None.  TECHNIQUE: Radiation dose reduction techniques were utilized, including automated exposure control and exposure modulation based on body size. Multiple axial images of the head were obtained without contrast.  FINDINGS:  There are no abnormal areas of increased density or mass effect. Advanced atrophy. There are mild scattered areas of decreased density in the white matter likely related to chronic ischemic gliotic changes.   Ventricles, sulci, and cisterns appear normal. Bone window images are unremarkable.         1. No acute intracranial abnormality.      This report was finalized on 8/12/2019 10:36 PM by Shailesh Alfaro M.D.      Xr Chest 1 View    Result Date: 8/12/2019  PORTABLE CHEST X-RAY  HISTORY: fall  COMPARISON: None.  FINDINGS: Portable AP view of the chest was obtained. The lungs are well inflated. There are some scattered fibrotic appearing opacities which are favored to be chronic. Linear densities in the left lower lobe most suggestive of scarring or atelectasis. There is no convincing evidence of active air space disease process otherwise. No edema or pleural fluid. Borderline cardiomegaly. Mild aortic ectasia. There are multiple old appearing bilateral rib deformities.        Chronic-appearing parenchymal changes with some linear scar and/or atelectasis in the left lower lobe.  This report was finalized on 8/12/2019 9:48 PM by Shailesh Alfaro M.D.      Xr Hip With Or Without Pelvis 2 - 3 View Right    Result Date: 8/12/2019  Pelvis and right hip x-rays  HISTORY: Fall, right hip pain.  TECHNIQUE: An AP view the pelvis, two images of the right hip and an AP view of the left hip are provided and are correlated with pelvis and right hip x-rays from 06/16/2016.  FINDINGS: There our bilateral, bipolar hip arthroplasties. There is cortical irregularity along the superior aspect of the right pubic bone, the midportion of the right inferior ischiopubic ramus, and the mid inferior left  ischiopubic ramus. These are consistent with fractures of unknown acuity. The sacrum is partly obscured by fecal material and bowel gas. No periprosthetic femur fracture is present. There is deformity at the left femoral lesser trochanter, compatible with an old injury to this location. At the upper edge of the field-of-view, advanced degenerative change in the lumbar spine with scoliotic curvature is observed along with a G-tube.      Bipolar hip arthroplasties bilaterally. Cortical irregularity of the ischio pubic rami bilaterally are compatible with pelvic fractures although their exact age is not ascertained. These are new since 2006. There is no evidence of femur fracture.  This report was finalized on 8/12/2019 9:51 PM by Dr. Brendan Stewart M.D.        I ordered the above noted radiological studies. Independently reviewed by me and discussed with radiologist.  See dictation above for official radiology interpretation.      PROCEDURES    Procedures        MEDICATIONS GIVEN IN ER    Medications   sodium chloride 0.9 % flush 10 mL (not administered)         PROGRESS, CONSULTS, MEDICAL DECISION MAKING  ED Course as of Aug 13 0137 Tue Aug 13, 2019   0125 Analysis of the data, reveals white blood cell count of 11, and hemoglobin 9.8.  These appear relatively stable.  The chemistry shows decreased albumin, volume depletion, hyponatremia, and slightly elevated liver enzymes.  Based on her clinical presentation this probably represents chronic malnutrition.  [DP]   0135 I did review the CT scan of the head as well as the imaging of the pelvis and discussed it with the radiologist.  The CT scan of the head shows nothing acute, and certainly no traumatic injury.  The pelvic x-ray is somewhat limited because of the bilateral prostheses and chronic arthritic changes.  However there are some subtle findings that suggest pubic rami fractures, certainly on the right and possibly on the left.  [DP]   0136 The nurse was able  to talk with the daughter on the phone who the patient normally lives with.  She does state that the living environment is not appropriate for her at this time, and that she is not able to help because of a bad back that she is receiving treatment for.  Because the patient's not able to ambulate under her own power, she will need to be admitted for further evaluation and treatment.  [DP]      ED Course User Index  [DP] Darius Esteves MD     0554- Initial encounter. The patient is resting comfortably and in NAD. D/w pt the lab and imaging results. Discussed the plan for ambulation test . Pt understands and agrees with the plan, all questions answered.          AS OF 1:37 AM VITALS:    BP - 158/68  HR - 60  TEMP - 98.1 °F (36.7 °C)  02 SATS - 91%        DIAGNOSIS  Final diagnoses:   Multiple closed fractures of pelvis without disruption of pelvic ring, initial encounter (CMS/[facility])   Failure to thrive in adult   Multiple falls         DISPOSITION  Admit          --  Documentation assistance provided by maria ines Waller for Dr. Esteves.  Information recorded by the maria ines was done at my direction and has been verified and validated by me.         Megan Waller  08/12/19 8034       Darius Esteves MD  08/13/19 8477

## 2019-08-13 NOTE — NURSING NOTE
Son-in-law (Grupo Hua) of patient in today to discuss his mom-in-laws care.  Patient resides with her daughter.  Daughter, Michelle Jeffrey, was concerned that her brother (Shaun Lindsey) may try to make medical decisions or seek information about her mother's care.  Michelle does not want her brother to make any decisions or to have any information regarding her plan of care released to him.  If brother attempts to make decisions or seeks medical information, Michelle would like the staff to refer her brother to her.  Michelle is also seeking to be the patient's POA. I informed Grupo that I would refer a  to his mother-in-law.  Michelle is also seeking long-time placement at a facility upon discharge.  I spoke with Mckayla ([facility]) and informed her of the situation.  Staff nurse, Kenia Valdes, made aware of my conversation with pt's son-in-law.

## 2019-08-13 NOTE — CONSULTS
Orthopedic Consult    Patient: Mary Lindsey                                           YOB: 1930     Date of Admission: 8/12/2019  8:33 PM            Medical Record Number: [id number]    Attending Physician: Melecio Levi MD    Consulting Physician: Tashia Pinto MD    Reason for Consult: Pelvic  Fractures.       History of Present Illness: 88 y.o. female admitted to [facility] with Pelvic fracture (CMS/HCC) [S32.9XXA]  Failure to thrive in adult [R62.7]  Multiple falls [R29.6]  Multiple closed fractures of pelvis without disruption of pelvic ring, initial encounter (CMS/HCC) [S32.82XA].    The patient was evaluated in the emergency room and was diagnosed with a pelvic fracture.   Secondary to the age and multiple medical co morbidities the patient was admitted to the hospitalist.   As I was on call for the emergency room I was consulted for further evaluation and treatment.   The patient was in the usual state of health and fell from standing height in her home, Resulting in sudden onset of bilateral groin pain and inability to ambulate. She states the pain is worse on the RIGHT than the LEFT.   Denies any history of loss of consciousness, headache, vomiting, or seizures.   Denies any other injuries.   The patient is accompanied by family members  to this hospital visit.   The patient has a history of bilateral total hip arthroplaties.    The patient uses a walker as assistive device prior to this fall.   The patient  lives at home with her daughter.  The patient has a history of dementia.    Patient denies any history of: DVT/PE, MRSA, Diabetes mellitus, or A-Fib.   The patient has history of : COPD, CHF, CAD, Dememtia, GERD, RA,  The patient is on not on anticoagulants.    Past medical history, past surgical history, social history, family history, ALLERGIES, current medications have been reviewed by me.    Past Medical History:   Diagnosis Date   • Arthritis    • CHF  (congestive heart failure) (CMS/HCC)    • COPD (chronic obstructive pulmonary disease) (CMS/[facility])    • Coronary artery disease    • Dementia    • Depression    • Disease of thyroid gland    • GERD (gastroesophageal reflux disease)    • Hyperlipidemia    • Hypertension    • Pneumonia     aspiration     Past Surgical History:   Procedure Laterality Date   • CARDIAC SURGERY      stents x 2   • GTUBE INSERTION     • JOINT REPLACEMENT Bilateral     hip   • NASAL SINUS SURGERY       Social History     Occupational History   • Not on file   Tobacco Use   • Smoking status: Never Smoker   • Smokeless tobacco: Never Used   Substance and Sexual Activity   • Alcohol use: No     Frequency: Never   • Drug use: No   • Sexual activity: Not on file      Social History     Social History Narrative   • Not on file     History reviewed. No pertinent family history.     No Known Allergies    Home Medications:  Medications Prior to Admission   Medication Sig Dispense Refill Last Dose   • albuterol sulfate  (90 Base) MCG/ACT inhaler Inhale 2 puffs Every 6 (Six) Hours As Needed for Wheezing.   8/12/2019 at Unknown time   • amitriptyline (ELAVIL) 10 MG tablet 10 mg by Per G Tube route Every Night.   Past Week at Unknown time   • aspirin 81 MG tablet 81 mg by Per G Tube route Daily.   8/12/2019 at Unknown time   • busPIRone (BUSPAR) 10 MG tablet 10 mg by Per G Tube route 2 (Two) Times a Day.   8/12/2019 at Unknown time   • FLUoxetine (PROzac) 20 MG capsule 20 mg by Per G Tube route Daily.   8/12/2019 at Unknown time   • furosemide (LASIX) 20 MG tablet 20 mg by Per G Tube route Daily.   8/12/2019 at Unknown time   • Isosorbide Mononitrate (IMDUR PO) 30 mg by Per G Tube route Daily.   8/12/2019 at Unknown time   • levothyroxine (SYNTHROID, LEVOTHROID) 75 MCG tablet 75 mcg by Per G Tube route Daily.   8/12/2019 at Unknown time   • metoprolol tartrate (LOPRESSOR) 25 MG tablet 12.5 mg by Per G Tube route 2 (Two) Times a Day.   8/12/2019 at  "Unknown time   • nitroglycerin (NITROSTAT) 0.4 MG SL tablet Place 0.4 mg under the tongue Every 5 (Five) Minutes As Needed for Chest Pain. Take no more than 3 doses in 15 minutes.   Past Week at Unknown time   • oxyCODONE HCl 15 MG tablet  15 mg by Per G Tube route Every 4 (Four) Hours As Needed (pain).   8/12/2019 at Unknown time   • Pantoprazole Sodium (PROTONIX PO) 40 mg by Per G Tube route Daily.   8/12/2019 at Unknown time       Current Medications:  Scheduled Meds:   Continuous Infusions:   PRN Meds:.•  HYDROmorphone  •  [COMPLETED] Insert peripheral IV **AND** sodium chloride    Review of Systems:   A 12 point system review was reviewed with the patient and from the chart  and is negative except as in history of present illness.      Physical Exam: 88 y.o. female                    Vitals:    08/12/19 2028 08/13/19 0237   BP: 158/68 153/64   BP Location:  Right arm   Patient Position:  Lying   Pulse: 60 61   Resp: 19 18   Temp: 98.1 °F (36.7 °C) 98.3 °F (36.8 °C)   TempSrc:  Oral   SpO2: 91% 92%   Weight: Comment: bed scale would not work    Height: 175.3 cm (69\")         Gait: Could not be tested , patient is nonambulatory.    Mental/HEENT/cardio/skin: The patient's general appearance was well-nourished, well-hydrated, no acute distress.  Orientation was alert and oriented to self. She is pleasantly confused. The patient's mood was normal.   Pulmonary exam shows normal late exchange, no labored breathing, or shortness of breath.    The skin exam showed normal temperature and color in the area of examination.    Extremities: Lower extremities no  shortening, no obvious deformity.   Compression of pelvis reproduced pain in the groin.   Attempted active movements of the   hip are painful and restricted. I am able to gently logroll the hip without significant pain.   The patient is able to do gentle active range of motion of her ankle and toes.   Gross sensation is intact over the toes.    Pulses: Pulses " palpable and equal bilaterally    Diagnostic Tests:    Results from last 7 days   Lab Units 08/12/19 2208   WBC 10*3/mm3 11.66*   HEMOGLOBIN g/dL 9.8*   HEMATOCRIT % 31.4*   PLATELETS 10*3/mm3 291     Results from last 7 days   Lab Units 08/12/19 2209   SODIUM mmol/L 131*   POTASSIUM mmol/L 4.9   CHLORIDE mmol/L 96*   CO2 mmol/L 26.6   BUN mg/dL 24*   CREATININE mg/dL 0.84   GLUCOSE mg/dL 87   CALCIUM mg/dL 8.1*     Results from last 7 days   Lab Units 08/12/19  2209   INR  1.12*     No results found for: URICACID  No results found for: CRYSTAL  Microbiology Results (last 10 days)     ** No results found for the last 240 hours. **        Ct Head Without Contrast    Result Date: 8/12/2019  1. No acute intracranial abnormality.      This report was finalized on 8/12/2019 10:36 PM by Shailesh Alfaro M.D.      Xr Chest 1 View    Result Date: 8/12/2019  Chronic-appearing parenchymal changes with some linear scar and/or atelectasis in the left lower lobe.  This report was finalized on 8/12/2019 9:48 PM by Shailesh Alfaro M.D.      Xr Hip With Or Without Pelvis 2 - 3 View Right    Result Date: 8/12/2019  Bipolar hip arthroplasties bilaterally. Cortical irregularity of the ischio pubic rami bilaterally are compatible with pelvic fractures although their exact age is not ascertained. These are new since 2006. There is no evidence of femur fracture.  This report was finalized on 8/12/2019 9:51 PM by Dr. Brendan Stewart M.D.        Assessment:  Pelvic  Fractures      Patient Active Problem List   Diagnosis   • Pelvic fracture (CMS/[facility])       Plan:    Options and alternatives have been discussed in detail with the patient and family     The patient has a stable pelvic injury.  The injury does not appear to be acute fractures.  The patient is indicated for non operative management.   Activity as tolerated.  Will request PT for evaluation and gait training. Weight bearing as tolerated with walker for transfers and short distance  gait.   May benefit from a short stay at a rehab center.  Pain management and DVT prophylaxis per admitting service.   Please notify PCP for possible evaluation of Osteoporosis if not already done.     The patient also has tenderness over the lateral right greater trochanter.  I will try and get her a bursal injection to the right greater trochanteric area with Depo-Medrol.  Follow up with me in office in 3-4 weeks. Call office [phone] for appointment.     Date: 8/13/2019    Tashia Pinto MD    CC: Provider, No Known; MD Gurmeet Huber Aftab, MD

## 2019-08-13 NOTE — NURSING NOTE
"Spoke with patients daughter Michelle Jeffrey for admission history. Pt has a tube feed in abdomen. Pt is on a clear liquid diet . Pt is wheelchair bound and total care at home. Daughter cannot recall name of pharmacy pt uses at this time.pts daughter stated \" my mother is on clear liquids including jello and she is on Jevity 24 hours a day. Patient refuses to be IN & OUT cathed for UA.  "

## 2019-08-13 NOTE — PLAN OF CARE
Problem: Fall Risk (Adult)  Goal: Absence of Fall  Outcome: Ongoing (interventions implemented as appropriate)   08/13/19 0450   Fall Risk (Adult)   Absence of Fall making progress toward outcome       Problem: Patient Care Overview  Goal: Plan of Care Review  Outcome: Ongoing (interventions implemented as appropriate)

## 2019-08-13 NOTE — PLAN OF CARE
Problem: Fall Risk (Adult)  Goal: Absence of Fall  Outcome: Ongoing (interventions implemented as appropriate)   08/13/19 0450   Fall Risk (Adult)   Absence of Fall making progress toward outcome       Problem: Patient Care Overview  Goal: Plan of Care Review  Outcome: Outcome(s) achieved Date Met: 08/13/19 08/13/19 0450   Coping/Psychosocial   Plan of Care Reviewed With patient   Plan of Care Review   Progress improving   OTHER   Outcome Summary HTN well controlled. pain well controlled.

## 2019-08-13 NOTE — PLAN OF CARE
Problem: Fall Risk (Adult)  Goal: Identify Related Risk Factors and Signs and Symptoms  Outcome: Outcome(s) achieved Date Met: 08/13/19    Goal: Absence of Fall  Outcome: Ongoing (interventions implemented as appropriate)      Problem: Patient Care Overview  Goal: Plan of Care Review  Outcome: Ongoing (interventions implemented as appropriate)   08/13/19 4737   Coping/Psychosocial   Plan of Care Reviewed With patient   Plan of Care Review   Progress improving   OTHER   Outcome Summary PT NOTED WITH SOME CONFUSION AND NUMEROUS ATTEMPTSTO GET OOB. BED ALARM ON. GTUBE INTACT AND WAITING FOR TUBE FEEDING TO BE DELIVERED TO START. PAIN WELL CONTROLLED AT THIS TIME WITH MINIMAL MEDICINE.     Goal: Individualization and Mutuality  Outcome: Ongoing (interventions implemented as appropriate)    Goal: Discharge Needs Assessment  Outcome: Ongoing (interventions implemented as appropriate)    Goal: Interprofessional Rounds/Family Conf  Outcome: Ongoing (interventions implemented as appropriate)      Problem: Skin Injury Risk (Adult)  Goal: Identify Related Risk Factors and Signs and Symptoms  Outcome: Outcome(s) achieved Date Met: 08/13/19    Goal: Skin Health and Integrity  Outcome: Ongoing (interventions implemented as appropriate)      Problem: Pain, Chronic (Adult)  Goal: Identify Related Risk Factors and Signs and Symptoms  Outcome: Outcome(s) achieved Date Met: 08/13/19    Goal: Acceptable Pain/Comfort Level and Functional Ability  Outcome: Ongoing (interventions implemented as appropriate)      Problem: Nutrition, Enteral (Adult)  Goal: Signs and Symptoms of Listed Potential Problems Will be Absent, Minimized or Managed (Nutrition, Enteral)  Outcome: Ongoing (interventions implemented as appropriate)

## 2019-08-13 NOTE — PROGRESS NOTES
Malnutrition Severity Assessment    Patient Name:  Mary Lindsey  YOB: 1930  MRN: [id number]  Admit Date:  8/12/2019    Patient meets criteria for : Moderate (non-severe) Malnutrition      Malnutrition Severity Assessment  Malnutrition Type: Starvation - Related Malnutrition     Malnutrition Type (last 8 hours)      Malnutrition Severity Assessment     Row Name 08/13/19 1323       Malnutrition Severity Assessment    Malnutrition Type  Starvation - Related Malnutrition    Row Name 08/13/19 1323       Unintentional Weight Loss     Unintentional Weight Loss   -- BMI-15.6, 72% IBW    Row Name 08/13/19 1323       Muscle Loss    [place]  Severe - deep hollowing/scooping, lack of muscle to touch, facial bones well defined    Clavicle Bone Region  Moderate - some protrusion in females, visible in males    Acromion Bone Region  Severe - squared shoulders, bones, and acromion process protrusion prominent    Patellar Region  Moderate - patella more prominent, less muscle definition around patella    Row Name 08/13/19 1323       Fat Loss    Subcutaneous Fat Loss Findings  Moderate    Orbital Region   Moderate -  somewhat hollowness, slightly dark circles    Thoracic & Lumbar Region  Moderate - ribs visible with mild depressions, iliac crest somewhat prominent    Row Name 08/13/19 1323       Criteria Met (Must meet criteria for severity in at least 2 of these categories: M Wasting, Fat Loss, Fluid, Secondary Signs, Wt. Status, Intake)    Patient meets criteria for   Moderate (non-severe) Malnutrition          Electronically signed by:  Elsy Bowen RD  08/13/19 1:32 PM

## 2019-08-13 NOTE — CONSULTS
Adult Nutrition  Assessment/PES    Patient Name:  Mary Lindsey  YOB: 1930  MRN: [id number]  Admit Date:  8/12/2019    Assessment Date:  8/13/2019    Nutrition assessment triggered by RN consult, MST score-4, low BMI-15.6. Moderate malnutrition. +PEG. Spoke with patient's daughter-patient's TF order at home: Isosource 1.5 @ 45 cc/hr-ordered. On clear liquids.  Reason for Assessment     Row Name 08/13/19 1321          Reason for Assessment    Reason For Assessment  identified at risk by screening criteria;nurse/nurse practitioner consult;TF/PN     Diagnosis   Primary Problem:  Pelvic fracture (CMS/HCC). FTT, CHF, CAD, COPD, dementia, HTN, HLD     Identified At Risk by Screening Criteria  MST SCORE 2+;BMI;tube feeding or parenteral nutrition         Nutrition/Diet History     Row Name 08/13/19 1322          Nutrition/Diet History    Typical Food/Fluid Intake  home TFs         Anthropometrics     Row Name 08/13/19 1322 08/13/19 0700       Anthropometrics    Height        Weight    48 kg (105 lb 13.1 oz)       Ideal Body Weight (IBW)    Ideal Body Weight (IBW) (kg)        % Ideal Body Weight           Body Mass Index (BMI)    BMI (kg/m2)        BMI Assessment  BMI less than 16: protein-energy malnutrition grade III          Labs/Tests/Procedures/Meds     Row Name 08/13/19 1322          Labs/Procedures/Meds    Lab Results Reviewed  reviewed, pertinent     Lab Results Comments  Na, ALT, AST        Diagnostic Tests/Procedures    Diagnostic Test/Procedure Reviewed  reviewed, pertinent        Medications    Pertinent Medications Reviewed  reviewed, pertinent         Physical Findings     Row Name 08/13/19 1323          Physical Findings    Overall Physical Appearance  loss of subcutaneous fat;loss of muscle mass;underweight B=13         Estimated/Assessed Needs     Row Name 08/13/19 1323 08/13/19 0700       Calculation Measurements    Weight Used For Calculations  48 kg (105 lb 13.1 oz)  --    Height  --  --        Estimated/Assessed Needs    Additional Documentation  KCAL/KG (Group);Protein Requirements (Group);Fluid Requirements (Group)  --       KCAL/KG    KCAL/KG  30 Kcal/Kg (kcal);35 Kcal/Kg (kcal)  --    30 Kcal/Kg (kcal)  1440  --    35 Kcal/Kg (kcal)  1680  --       Protein Requirements    Est Protein Requirement Amount (gms/kg)  1.4 gm protein  --    Estimated Protein Requirements (gms/day)  67.2  --       Fluid Requirements    Estimated Fluid Requirements (mL/day)  1500  --    RDA Method (mL)  1500  --    Michie-Segar Method (over 20 kg)  2460  --        Nutrition Prescription Ordered     Row Name 08/13/19 1323          Nutrition Prescription PO    Current PO Diet  Clear Liquid         Evaluation of Received Nutrient/Fluid Intake     Row Name 08/13/19 1323 08/13/19 0700       Calculation Measurements    Weight Used For Calculations  48 kg (105 lb 13.1 oz)  --    Height  --  --        Evaluation of Prescribed Nutrient/Fluid Intake     Row Name 08/13/19 1323 08/13/19 0700       Calculation Measurements    Weight Used For Calculations  48 kg (105 lb 13.1 oz)  --    Height  --  --        Malnutrition Severity Assessment     Row Name 08/13/19 1323          Malnutrition Severity Assessment    Malnutrition Type  Starvation - Related Malnutrition        Unintentional Weight Loss     Unintentional Weight Loss   -- BMI-15.6, 72% IBW        Muscle Loss    [place]  Severe - deep hollowing/scooping, lack of muscle to touch, facial bones well defined     Clavicle Bone Region  Moderate - some protrusion in females, visible in males     Acromion Bone Region  Severe - squared shoulders, bones, and acromion process protrusion prominent     Patellar Region  Moderate - patella more prominent, less muscle definition around patella        Fat Loss    Subcutaneous Fat Loss Findings  Moderate     Orbital Region   Moderate -  somewhat hollowness, slightly dark circles     Thoracic & Lumbar Region  Moderate - ribs visible with  mild depressions, iliac crest somewhat prominent        Criteria Met (Must meet criteria for severity in at least 2 of these categories: M Wasting, Fat Loss, Fluid, Secondary Signs, Wt. Status, Intake)    Patient meets criteria for   Moderate (non-severe) Malnutrition           Problem/Interventions:  Problem 1     Row Name 08/13/19 1323          Nutrition Diagnoses Problem 1    Problem 1  Malnutrition     Etiology (related to)  MNT for Treatment/Condition     Signs/Symptoms (evidenced by)  BMI;% IBW     BMI  Less than 16     Percent (%) IBW  72 %                 Intervention Goal     Row Name 08/13/19 1330          Intervention Goal    General  Maintain nutrition;Nutrition support treatment     PO  Tolerate PO     TF/PN  Inititiate TF/PN     Weight  Appropriate weight gain         Nutrition Intervention     Row Name 08/13/19 1330          Nutrition Intervention    RD/Tech Action  Follow Tx progress;Care plan reviewd called patient's daughter         Nutrition Prescription     Row Name 08/13/19 8624          Nutrition Prescription EN    Enteral Prescription  Enteral begin/change;Enteral to supply     Enteral Route  PEG     Product  Isosource 1.5 teddy     TF Delivery Method  Continuous     Continuous TF Goal Rate (mL/hr)  45 mL/hr     Water flush (mL)   30 mL     Water Flush Frequency  Every 4 hours     New EN Prescription Ordered?  Yes        EN to Supply    Kcal/Day  1620 Kcal/Day     Protein (gm/day)  73 gm/day     TF Free H2O (mL)  820 mL     Total Free H2O (mL/day)  180 mL/day         Education/Evaluation     Row Name 08/13/19 1270          Education    Education  Will Instruct as appropriate        Monitor/Evaluation    Monitor  Per protocol           Electronically signed by:  Elsy Bowen RD  08/13/19 1:31 PM

## 2019-08-13 NOTE — ED NOTES
"Pt currently awake, alert to self and place, but is disoriented to situation and time.  Pt stating \"Someone's knocking on the door\" and \"I think I lost my purse\".       Amy Buenrostro, RN  08/13/19 0133    "

## 2019-08-14 LAB
ALBUMIN SERPL-MCNC: 2.9 G/DL (ref 3.5–5.2)
ALBUMIN/GLOB SERPL: 0.8 G/DL
ALP SERPL-CCNC: 114 U/L (ref 39–117)
ALT SERPL W P-5'-P-CCNC: 87 U/L (ref 1–33)
ANION GAP SERPL CALCULATED.3IONS-SCNC: 11.5 MMOL/L (ref 5–15)
ANISOCYTOSIS BLD QL: ABNORMAL
AST SERPL-CCNC: 61 U/L (ref 1–32)
BILIRUB SERPL-MCNC: 0.9 MG/DL (ref 0.2–1.2)
BUN BLD-MCNC: 17 MG/DL (ref 8–23)
BUN/CREAT SERPL: 22.4 (ref 7–25)
BURR CELLS BLD QL SMEAR: ABNORMAL
CALCIUM SPEC-SCNC: 8.1 MG/DL (ref 8.6–10.5)
CHLORIDE SERPL-SCNC: 104 MMOL/L (ref 98–107)
CHOLEST SERPL-MCNC: 123 MG/DL (ref 0–200)
CO2 SERPL-SCNC: 20.5 MMOL/L (ref 22–29)
CREAT BLD-MCNC: 0.76 MG/DL (ref 0.57–1)
DEPRECATED RDW RBC AUTO: 60.9 FL (ref 37–54)
EOSINOPHIL # BLD MANUAL: 0.12 10*3/MM3 (ref 0–0.4)
EOSINOPHIL NFR BLD MANUAL: 1 % (ref 0.3–6.2)
ERYTHROCYTE [DISTWIDTH] IN BLOOD BY AUTOMATED COUNT: 16 % (ref 12.3–15.4)
GFR SERPL CREATININE-BSD FRML MDRD: 72 ML/MIN/1.73
GLOBULIN UR ELPH-MCNC: 3.5 GM/DL
GLUCOSE BLD-MCNC: 107 MG/DL (ref 65–99)
HBA1C MFR BLD: 4.89 % (ref 4.8–5.6)
HCT VFR BLD AUTO: 32 % (ref 34–46.6)
HDLC SERPL-MCNC: 47 MG/DL (ref 40–60)
HGB BLD-MCNC: 10.3 G/DL (ref 12–15.9)
LDLC SERPL CALC-MCNC: 62 MG/DL (ref 0–100)
LDLC/HDLC SERPL: 1.33 {RATIO}
LYMPHOCYTES # BLD MANUAL: 0.47 10*3/MM3 (ref 0.7–3.1)
LYMPHOCYTES NFR BLD MANUAL: 4 % (ref 19.6–45.3)
LYMPHOCYTES NFR BLD MANUAL: 6 % (ref 5–12)
MCH RBC QN AUTO: 33.9 PG (ref 26.6–33)
MCHC RBC AUTO-ENTMCNC: 32.2 G/DL (ref 31.5–35.7)
MCV RBC AUTO: 105.3 FL (ref 79–97)
MICROCYTES BLD QL: ABNORMAL
MONOCYTES # BLD AUTO: 0.7 10*3/MM3 (ref 0.1–0.9)
NEUTROPHILS # BLD AUTO: 10.4 10*3/MM3 (ref 1.7–7)
NEUTROPHILS NFR BLD MANUAL: 89 % (ref 42.7–76)
NT-PROBNP SERPL-MCNC: 2952 PG/ML (ref 5–1800)
PLAT MORPH BLD: NORMAL
PLATELET # BLD AUTO: 335 10*3/MM3 (ref 140–450)
PMV BLD AUTO: 10.6 FL (ref 6–12)
POIKILOCYTOSIS BLD QL SMEAR: ABNORMAL
POTASSIUM BLD-SCNC: 4.2 MMOL/L (ref 3.5–5.2)
PROT SERPL-MCNC: 6.4 G/DL (ref 6–8.5)
RBC # BLD AUTO: 3.04 10*6/MM3 (ref 3.77–5.28)
SODIUM BLD-SCNC: 136 MMOL/L (ref 136–145)
TRIGL SERPL-MCNC: 68 MG/DL (ref 0–150)
TSH SERPL DL<=0.05 MIU/L-ACNC: 2.08 MIU/ML (ref 0.27–4.2)
VLDLC SERPL-MCNC: 13.6 MG/DL (ref 5–40)
WBC MORPH BLD: NORMAL
WBC NRBC COR # BLD: 11.68 10*3/MM3 (ref 3.4–10.8)

## 2019-08-14 PROCEDURE — 80053 COMPREHEN METABOLIC PANEL: CPT | Performed by: HOSPITALIST

## 2019-08-14 PROCEDURE — 97162 PT EVAL MOD COMPLEX 30 MIN: CPT

## 2019-08-14 PROCEDURE — 85025 COMPLETE CBC W/AUTO DIFF WBC: CPT | Performed by: HOSPITALIST

## 2019-08-14 PROCEDURE — 97110 THERAPEUTIC EXERCISES: CPT

## 2019-08-14 PROCEDURE — 84443 ASSAY THYROID STIM HORMONE: CPT | Performed by: HOSPITALIST

## 2019-08-14 PROCEDURE — 83036 HEMOGLOBIN GLYCOSYLATED A1C: CPT | Performed by: HOSPITALIST

## 2019-08-14 PROCEDURE — 25010000002 CEFTRIAXONE PER 250 MG: Performed by: HOSPITALIST

## 2019-08-14 PROCEDURE — 85007 BL SMEAR W/DIFF WBC COUNT: CPT | Performed by: HOSPITALIST

## 2019-08-14 PROCEDURE — G0378 HOSPITAL OBSERVATION PER HR: HCPCS

## 2019-08-14 PROCEDURE — 83880 ASSAY OF NATRIURETIC PEPTIDE: CPT | Performed by: HOSPITALIST

## 2019-08-14 PROCEDURE — 80061 LIPID PANEL: CPT | Performed by: HOSPITALIST

## 2019-08-14 RX ORDER — CEFTRIAXONE SODIUM 1 G/50ML
1 INJECTION, SOLUTION INTRAVENOUS EVERY 24 HOURS
Status: DISCONTINUED | OUTPATIENT
Start: 2019-08-14 | End: 2019-08-16

## 2019-08-14 RX ORDER — METHYLPREDNISOLONE ACETATE 40 MG/ML
40 INJECTION, SUSPENSION INTRA-ARTICULAR; INTRALESIONAL; INTRAMUSCULAR; SOFT TISSUE ONCE
Status: DISCONTINUED | OUTPATIENT
Start: 2019-08-14 | End: 2019-08-16 | Stop reason: HOSPADM

## 2019-08-14 RX ORDER — LIDOCAINE HYDROCHLORIDE 10 MG/ML
10 INJECTION, SOLUTION INFILTRATION; PERINEURAL ONCE
Status: DISCONTINUED | OUTPATIENT
Start: 2019-08-14 | End: 2019-08-16 | Stop reason: HOSPADM

## 2019-08-14 RX ADMIN — LEVOTHYROXINE SODIUM 75 MCG: 75 TABLET ORAL at 10:06

## 2019-08-14 RX ADMIN — CEFTRIAXONE SODIUM 1 G: 1 INJECTION, SOLUTION INTRAVENOUS at 16:59

## 2019-08-14 RX ADMIN — OXYCODONE HYDROCHLORIDE 5 MG: 5 SOLUTION ORAL at 10:10

## 2019-08-14 RX ADMIN — METOPROLOL TARTRATE 12.5 MG: 25 TABLET ORAL at 10:05

## 2019-08-14 RX ADMIN — PANTOPRAZOLE SODIUM 40 MG: 40 TABLET, DELAYED RELEASE ORAL at 10:05

## 2019-08-14 RX ADMIN — METOPROLOL TARTRATE 25 MG: 25 TABLET ORAL at 21:52

## 2019-08-14 RX ADMIN — FLUOXETINE HYDROCHLORIDE 20 MG: 20 CAPSULE ORAL at 10:06

## 2019-08-14 RX ADMIN — AMITRIPTYLINE HYDROCHLORIDE 10 MG: 10 TABLET, FILM COATED ORAL at 21:52

## 2019-08-14 RX ADMIN — OXYCODONE HYDROCHLORIDE 5 MG: 5 SOLUTION ORAL at 03:38

## 2019-08-14 RX ADMIN — ASPIRIN 81 MG: 81 TABLET, COATED ORAL at 10:05

## 2019-08-14 RX ADMIN — SODIUM CHLORIDE 75 ML/HR: 9 INJECTION, SOLUTION INTRAVENOUS at 10:13

## 2019-08-14 RX ADMIN — OXYCODONE HYDROCHLORIDE 5 MG: 5 SOLUTION ORAL at 22:01

## 2019-08-14 NOTE — DISCHARGE PLACEMENT REQUEST
"Rex Tillman (88 y.o. Female)     Date of Birth Social Security Number Address Home Phone MRN    12/26/1930  [address] [address] [phone] [id number]    Sh[place] Marital Status          None        Admission Date Admission Type Admitting Provider Attending Provider Department, Room/Bed    8/12/19 Emergency Melecio Levi MD Ahmed, Aftab, MD [address], P885/1    Discharge Date Discharge Disposition Discharge Destination                       Attending Provider:  Melecio Levi MD    Allergies:  No Known Allergies    Isolation:  None   Infection:  None   Code Status:  No CPR    Ht:  175.3 cm (69\")   Wt:  48 kg (105 lb 13.1 oz)    Admission Cmt:  None   Principal Problem:  None                Active Insurance as of 8/12/2019     Primary Coverage     Payor Plan Insurance Group Employer/Plan Group    [facility] MEDICARE REPLACEMENT [facility] [id number]     Payor Plan Address Payor Plan Phone Number Payor Plan Fax Number Effective Dates    [address] [address]   1/1/2019 - None Entered    [facility] [id number]       Subscriber Name Subscriber Birth Date Member ID       REX TILLMAN 12/26/1930 [id number]                 Emergency Contacts      (Rel.) Home Phone Work Phone Mobile Phone    Michelle Jeffrey (Daughter) [phone] -- --              "

## 2019-08-14 NOTE — PROGRESS NOTES
Discharge Planning Assessment  [facility]     Patient Name: Mary Lindsey  MRN: [id number]  Today's Date: 8/14/2019    Admit Date: 8/12/2019    Discharge Needs Assessment     Row Name 08/14/19 1422       Living Environment    Lives With  child(lexi), adult    Current Living Arrangements  home/apartment/condo    Primary Care Provided by  child(lexi)    Provides Primary Care For  no one    Family Caregiver if Needed  child(lexi), adult    Family Caregiver Names  Michelle    Quality of Family Relationships  helpful;involved    Able to Return to Prior Arrangements  yes       Resource/Environmental Concerns    Resource/Environmental Concerns  none       Transition Planning    Patient/Family Anticipates Transition to  long term care facility    Patient/Family Anticipated Services at Transition  skilled nursing       Discharge Needs Assessment    Concerns to be Addressed  discharge planning    Equipment Currently Used at Home  wheelchair    Anticipated Changes Related to Illness  inability to care for self    Discharge Facility/Level of Care Needs  nursing facility, skilled    Offered/Gave Vendor List  yes    Discharge Coordination/Progress  SNF/LTC; referrals pending        Discharge Plan     Row Name 08/14/19 1656       Plan    Plan  SNF/LTC; referrals pending     Patient/Family in Agreement with Plan  yes    Plan Comments  Spoke with pt's daughter Michelle [phone]. She reports she lives with pt in pt's house. Michelle reports she is pt's primary caregiver, pt has a son that isn't involved per daughter. She feels pt will need SNF and long term care at d/c. She would like a referral to [facility] as pt has been there in the past. Pt is open to other referrals if needed. CCP to follow…ANN MARIE Mae        Destination      Service Provider Request Status Selected Services Address Phone Number Fax Number    [facility] [address] [facility] [facility] [address] [phone]  [phone]      Durable Medical Equipment      No service coordination in this encounter.      Dialysis/Infusion      No service coordination in this encounter.      Home Medical Care      No service coordination in this encounter.      Therapy      No service coordination in this encounter.      Community Resources      No service coordination in this encounter.          Demographic Summary     Row Name 08/14/19 1423       General Information    Admission Type  inpatient    Arrived From  home    Preferred Language  English        Functional Status     Row Name 08/14/19 1423       Functional Status    Usual Activity Tolerance  poor    Current Activity Tolerance  poor       Functional Status, IADL    Medications  completely dependent    Meal Preparation  completely dependent    Housekeeping  completely dependent    Laundry  completely dependent    Shopping  completely dependent        Psychosocial    No documentation.       Abuse/Neglect    No documentation.       Legal    No documentation.       Substance Abuse    No documentation.       Patient Forms    No documentation.           Darby Davis

## 2019-08-14 NOTE — PROGRESS NOTES
"Daily progress note    Chief complaint  Doing same  Still with pain  No new complaints    History of present illness  88-year-old white female with history of COPD CHF coronary artery disease hypertension hyperlipidemia hypothyroidism also have dementia with severe osteoarthritis osteoporosis who was a nursing home resident brought to the emergency room after mechanical fall yesterday with bilateral hip pain and pelvic pain.  Patient stated that she has been falling a lot lately without losing consciousness but mainly she lost her balance and fell.  Patient denies any chest pain increased shortness of breath dizziness.  Patient hurting more on the left hip than the right.  Patient work-up in ER revealed multiple pelvic fractures admit for management     REVIEW OF SYSTEMS  Poor historian  Constitutional: Positive: fall   Musculoskeletal: Positive for arthralgias (bilateral hip pain).      PHYSICAL EXAM  Blood pressure 149/68, pulse 115, temperature 97.2 °F (36.2 °C), temperature source Oral, resp. rate 16, height 175.3 cm (69\"), weight 48 kg (105 lb 13.1 oz), SpO2 90 %.    GENERAL: Chronically ill-appearing, disheveled with multiple bruises on her extremities  HENT: NCAT, neck supple, trachea midline  EYES: no scleral icterus, PERRL, normal conjunctiva  CV: regular rhythm, regular rate, no murmur  RESPIRATORY: unlabored effort, CTAB  ABDOMEN: soft, non-tender, non-distended, bowel sounds present.  There is a G-tube present with no signs of infection or leakage.  MUSCULOSKELETAL: no gross deformity, TTP mild over R hip, slight pain with internal and external rotation.patient was unable to ambulate on her own even with a walker.  The pain in the right hip contributed to some unstable weightbearing and she had to be supported fully by one other person   nEURO: alert,  sensory and motor function of extremities grossly intact, speech clear, mental status normal/baseline. Pt is oriented but inaccurate with some answers and " repeatative  SKIN: warm, dry, no rash, multiple bruising to the BLE.   PSYCH:  Appropriate mood and affect    LAB RESULTS  Lab Results (last 24 hours)     Procedure Component Value Units Date/Time    CBC & Differential [[id number]] Collected:  08/14/19 0318    Specimen:  Blood Updated:  08/14/19 0448    Narrative:       The following orders were created for panel order CBC & Differential.  Procedure                               Abnormality         Status                     ---------                               -----------         ------                     CBC Auto Differential[[id number]]        Abnormal            Final result                 Please view results for these tests on the individual orders.    CBC Auto Differential [[id number]]  (Abnormal) Collected:  08/14/19 0318    Specimen:  Blood Updated:  08/14/19 0448     WBC 11.68 10*3/mm3      RBC 3.04 10*6/mm3      Hemoglobin 10.3 g/dL      Hematocrit 32.0 %      .3 fL      MCH 33.9 pg      MCHC 32.2 g/dL      RDW 16.0 %      RDW-SD 60.9 fl      MPV 10.6 fL      Platelets 335 10*3/mm3     Manual Differential [[id number]]  (Abnormal) Collected:  08/14/19 0318    Specimen:  Blood Updated:  08/14/19 0448     Neutrophil % 89.0 %      Lymphocyte % 4.0 %      Monocyte % 6.0 %      Eosinophil % 1.0 %      Neutrophils Absolute 10.40 10*3/mm3      Lymphocytes Absolute 0.47 10*3/mm3      Monocytes Absolute 0.70 10*3/mm3      Eosinophils Absolute 0.12 10*3/mm3      Anisocytosis Slight/1+     Leni Cells Mod/2+     Microcytes Slight/1+     Poikilocytes Mod/2+     WBC Morphology Normal     Platelet Morphology Normal    BNP [[id number]]  (Abnormal) Collected:  08/14/19 0318    Specimen:  Blood Updated:  08/14/19 0443     proBNP 2,952.0 pg/mL     Narrative:       Among patients with dyspnea, NT-proBNP is highly sensitive for the detection of acute congestive heart failure. In addition NT-proBNP of <300 pg/ml effectively rules out acute congestive heart failure with  99% negative predictive value.    TSH [[id number]]  (Normal) Collected:  08/14/19 0318    Specimen:  Blood Updated:  08/14/19 0443     TSH 2.080 mIU/mL     Comprehensive Metabolic Panel [[id number]]  (Abnormal) Collected:  08/14/19 0318    Specimen:  Blood Updated:  08/14/19 0438     Glucose 107 mg/dL      BUN 17 mg/dL      Creatinine 0.76 mg/dL      Sodium 136 mmol/L      Potassium 4.2 mmol/L      Chloride 104 mmol/L      CO2 20.5 mmol/L      Calcium 8.1 mg/dL      Total Protein 6.4 g/dL      Albumin 2.90 g/dL      ALT (SGPT) 87 U/L      AST (SGOT) 61 U/L      Alkaline Phosphatase 114 U/L      Total Bilirubin 0.9 mg/dL      eGFR Non African Amer 72 mL/min/1.73      Globulin 3.5 gm/dL      A/G Ratio 0.8 g/dL      BUN/Creatinine Ratio 22.4     Anion Gap 11.5 mmol/L     Narrative:       GFR Normal >60  Chronic Kidney Disease <60  Kidney Failure <15    Lipid Panel [[id number]] Collected:  08/14/19 0318    Specimen:  Blood Updated:  08/14/19 0437     Total Cholesterol 123 mg/dL      Triglycerides 68 mg/dL      HDL Cholesterol 47 mg/dL      LDL Cholesterol  62 mg/dL      VLDL Cholesterol 13.6 mg/dL      LDL/HDL Ratio 1.33    Narrative:       Cholesterol Reference Ranges  (U.S. Department of Health and Human Services ATP III Classifications)    Desirable          <200 mg/dL  Borderline High    200-239 mg/dL  High Risk          >240 mg/dL      Triglyceride Reference Ranges  (U.S. Department of Health and Human Services ATP III Classifications)    Normal           <150 mg/dL  Borderline High  150-199 mg/dL  High             200-499 mg/dL  Very High        >500 mg/dL    HDL Reference Ranges  (U.S. Department of Health and Human Services ATP III Classifcations)    Low     <40 mg/dl (major risk factor for CHD)  High    >60 mg/dl ('negative' risk factor for CHD)        LDL Reference Ranges  (U.S. Department of Health and Human Services ATP III Classifcations)    Optimal          <100 mg/dL  Near Optimal     100-129  mg/dL  Borderline High  130-159 mg/dL  High             160-189 mg/dL  Very High        >189 mg/dL    Hemoglobin A1c [[id number]]  (Normal) Collected:  08/14/19 0318    Specimen:  Blood Updated:  08/14/19 0409     Hemoglobin A1C 4.89 %     Narrative:       Hemoglobin A1C Ranges:    Increased Risk for Diabetes  5.7% to 6.4%  Diabetes                     >= 6.5%  Diabetic Goal                < 7.0%    Urinalysis With Microscopic If Indicated (No Culture) - Urine, Catheter [[id number]]  (Abnormal) Collected:  08/13/19 1536    Specimen:  Urine, Catheter Updated:  08/13/19 1608     Color, UA Yellow     Appearance, UA Cloudy     pH, UA 8.5     Specific Gravity, UA 1.020     Glucose, UA Negative     Ketones, UA 15 mg/dL (1+)     Bilirubin, UA Negative     Blood, UA Negative     Protein, UA Trace     Leuk Esterase, UA Small (1+)     Nitrite, UA Negative     Urobilinogen, UA 1.0 E.U./dL    Urinalysis, Microscopic Only - Urine, Catheter [[id number]]  (Abnormal) Collected:  08/13/19 1536    Specimen:  Urine, Catheter Updated:  08/13/19 1608     RBC, UA 3-5 /HPF      WBC, UA 6-12 /HPF      Bacteria, UA 2+ /HPF      Squamous Epithelial Cells, UA 0-2 /HPF      Hyaline Casts, UA 0-2 /LPF      Methodology Automated Microscopy        Imaging Results (last 24 hours)     ** No results found for the last 24 hours. **          Current Facility-Administered Medications:   •  albuterol (PROVENTIL) nebulizer solution 0.083% 2.5 mg/3mL, 2.5 mg, Nebulization, Q6H PRN, Melecio Levi MD  •  amitriptyline (ELAVIL) tablet 10 mg, 10 mg, Per G Tube, Nightly, Melecio Levi MD, 10 mg at 08/13/19 2016  •  aspirin EC tablet 81 mg, 81 mg, Oral, Daily, Melecio Levi MD, 81 mg at 08/14/19 1005  •  busPIRone (BUSPAR) tablet 10 mg, 10 mg, Per G Tube, TID PRN, Melecio Levi MD  •  FLUoxetine (PROzac) capsule 20 mg, 20 mg, Per G Tube, Daily, Melecio Levi MD, 20 mg at 08/14/19 1006  •  levothyroxine (SYNTHROID, LEVOTHROID) tablet 75 mcg, 75 mcg, Per G Tube,  Daily, Mitchell Prieto MD, 75 mcg at 08/14/19 1006  •  metoprolol tartrate (LOPRESSOR) tablet 12.5 mg, 12.5 mg, Per G Tube, Q12H, Mitchell Prieto MD, 12.5 mg at 08/14/19 1005  •  oxyCODONE (ROXICODONE) 5 MG/5ML solution 5 mg, 5 mg, Oral, Q4H PRN, Mitchell Prieto MD, 5 mg at 08/14/19 1010  •  pantoprazole (PROTONIX) EC tablet 40 mg, 40 mg, Oral, Daily, Mitchell Prieto MD, 40 mg at 08/14/19 1005  •  [COMPLETED] Insert peripheral IV, , , Once **AND** sodium chloride 0.9 % flush 10 mL, 10 mL, Intravenous, PRN, Darius Esteves MD  •  sodium chloride 0.9 % infusion, 75 mL/hr, Intravenous, Continuous, Mitchell Prieto MD, Last Rate: 75 mL/hr at 08/14/19 1413, 75 mL/hr at 08/14/19 1413     ASSESSMENT  Multiple pelvic fractures  Acute UTI  Status post fall  Bilateral hip pain with endoprosthesis in the past  COPD  Congestive heart failure  Coronary artery disease  Anxiety disorder  Depression  Dementia  Hypertension   Hyperlipidemia  Hypothyroidism  Osteoarthritis  Osteoporosis    PLAN  CPM  Bedrest  Pain management  IV antibiotics  Orthopedic surgery consult pending  Continue nursing home medications and adjust the doses  Stress ulcer DVT prophylaxis  Supportive care  DNR  PT OT  Follow closely and further recommendations according to hospital course    MITCHELL PRIETO MD

## 2019-08-14 NOTE — THERAPY EVALUATION
Patient Name: Mary Lindsey  : 1930    MRN: [id number]                              Today's Date: 2019       Admit Date: 2019    Visit Dx:     ICD-10-CM ICD-9-CM   1. Multiple closed fractures of pelvis without disruption of pelvic ring, initial encounter (CMS/[facility]) S32.82XA 808.44   2. Failure to thrive in adult R62.7 783.7   3. Multiple falls R29.6 V15.88     Patient Active Problem List   Diagnosis   • Pelvic fracture (CMS/[facility])     Past Medical History:   Diagnosis Date   • Arthritis    • CHF (congestive heart failure) (CMS/[facility])    • COPD (chronic obstructive pulmonary disease) (CMS/[facility])    • Coronary artery disease    • Dementia    • Depression    • Disease of thyroid gland    • GERD (gastroesophageal reflux disease)    • Hyperlipidemia    • Hypertension    • Pneumonia     aspiration     Past Surgical History:   Procedure Laterality Date   • CARDIAC SURGERY      stents x 2   • GTUBE INSERTION     • JOINT REPLACEMENT Bilateral     hip   • NASAL SINUS SURGERY       General Information     Row Name 19 1635 19 1133       PT Evaluation Time/Intention    Document Type  evaluation pt adm after fall at home, multiple pelvic fractures  -PC  --    Mode of Treatment  physical therapy  -PC  --    Evaluation Not Performed  --  other (see comments) OT talked with RN, At this time the pt. is very confused and not appropriate for OT, OT will sign off at this time and  when the pt. is appropriate for OT please re-order.    -VS    Row Name 19 5924          General Information    Patient Profile Reviewed?  yes  -PC     Prior Level of Function  -- unsure, pt from home, lives with daughter  -PC     Existing Precautions/Restrictions  fall  -PC     Barriers to Rehab  cognitive status  -PC     Row Name 19 1422          Relationship/Environment    Lives With  child(lexi), adult  -EC     Family Caregiver if Needed  child(lexi), adult  -EC     Family Caregiver Names  Michelle  -EC     Row Name 19  1422          Resource/Environmental Concerns    Current Living Arrangements  home/apartment/condo  -     Resource/Environmental Concerns  none  -EC     Row Name 08/14/19 1635          Cognitive Assessment/Intervention- PT/OT    Orientation Status (Cognition)  person;disoriented to;place;situation;time  -     Cognitive Assessment/Intervention Comment  pleasantly confused  -PC     Row Name 08/14/19 1635          Safety Issues, Functional Mobility    Safety Issues Affecting Function (Mobility)  insight into deficits/self awareness;judgment  -PC     Impairments Affecting Function (Mobility)  cognition  -PC       User Key  (r) = Recorded By, (t) = Taken By, (c) = Cosigned By    Initials Name Provider Type    VS Alberta Dodson OTR Occupational Therapist    PC Brit Capps, PT Physical Therapist    EC Darby Davis         Mobility     Row Name 08/14/19 1642          Bed Mobility Assessment/Treatment    Bed Mobility Assessment/Treatment  bed mobility (all) activities;supine-sit;sit-supine  -PC     Supine-Sit [place] (Bed Mobility)  minimum assist (75% patient effort)  -PC     Row Name 08/14/19 1642          Sit-Stand Transfer    Sit-Stand [place] (Transfers)  minimum assist (75% patient effort);2 person assist  -PC     Assistive Device (Sit-Stand Transfers)  walker, front-wheeled  -PC     Row Name 08/14/19 1642          Gait/Stairs Assessment/Training    [place] Level (Gait)  2 person assist;moderate assist (50% patient effort);minimum assist (75% patient effort)  -PC     Assistive Device (Gait)  walker, front-wheeled  -PC     Distance in Feet (Gait)  5 ft  -PC     Deviations/Abnormal Patterns (Gait)  antalgic;gait speed decreased;stride length decreased  -PC     Bilateral Gait Deviations  heel strike decreased  -PC       User Key  (r) = Recorded By, (t) = Taken By, (c) = Cosigned By    Initials Name Provider Type    PC Brit Capps, PT Physical Therapist         Obj/Interventions     Row Name 08/14/19 1644          General ROM    GENERAL ROM COMMENTS  WFL  -PC     Row Name 08/14/19 1644          MMT (Manual Muscle Testing)    General MMT Comments  grossly observe at least 3+/5  -PC     Row Name 08/14/19 1644          Static Sitting Balance    Level of [place] (Unsupported Sitting, Static Balance)  supervision  -PC     Sitting Position (Unsupported Sitting, Static Balance)  sitting on edge of bed  -PC     Row Name 08/14/19 1644          Static Standing Balance    Level of [place] (Supported Standing, Static Balance)  minimal assist, 75% patient effort  -PC     Assistive Device Utilized (Supported Standing, Static Balance)  walker, rolling  -PC       User Key  (r) = Recorded By, (t) = Taken By, (c) = Cosigned By    Initials Name Provider Type    PC Brit Capps, PT Physical Therapist        Goals/Plan     Row Name 08/14/19 1647          Bed Mobility Goal 1 (PT)    Activity/Assistive Device (Bed Mobility Goal 1, PT)  sit to supine;supine to sit  -PC     [place] Level/Cues Needed (Bed Mobility Goal 1, PT)  contact guard assist  -PC     Time Frame (Bed Mobility Goal 1, PT)  1 week  -PC     Row Name 08/14/19 1647          Transfer Goal 1 (PT)    Activity/Assistive Device (Transfer Goal 1, PT)  sit-to-stand/stand-to-sit  -PC     [place] Level/Cues Needed (Transfer Goal 1, PT)  contact guard assist  -PC     Time Frame (Transfer Goal 1, PT)  1 week  -PC     Row Name 08/14/19 1647          Gait Training Goal 1 (PT)    Activity/Assistive Device (Gait Training Goal 1, PT)  gait (walking locomotion);assistive device use;decrease fall risk;diminish gait deviation;walker, rolling  -PC     [place] Level (Gait Training Goal 1, PT)  minimum assist (75% or more patient effort)  -PC     Distance (Gait Goal 1, PT)  30 ft  -PC     Time Frame (Gait Training Goal 1, PT)  1 week  -PC       User Key  (r) = Recorded By, (t) = Taken By, (c) = Cosigned By    Initials  Name Provider Type    PC Brit Capps, PT Physical Therapist        Clinical Impression     Row Name 08/14/19 1649 08/14/19 1000       Plan of Care Review    Plan of Care Reviewed With  patient  -PC  patient  -MP    Row Name 08/14/19 1645          Physical Therapy Clinical Impression    Criteria for Skilled Interventions Met (PT Clinical Impression)  yes;treatment indicated  -PC     Rehab Potential (PT Clinical Summary)  fair, will monitor progress closely  -PC     Row Name 08/14/19 1645          Positioning and Restraints    Pre-Treatment Position  in bed  -PC     Post Treatment Position  chair  -PC     In Chair  reclined;call light within reach;encouraged to call for assist;exit alarm on;notified nsg  -PC     Row Name 08/14/19 1645          Pain Scale: Word Pre/Post-Treatment    Pain: Word Scale, Pretreatment  4 - moderate pain  -PC     Pain: Word Scale, Post-Treatment  4 - moderate pain  -PC     Pre/Post Treatment Pain Comment  pt c/o B LE pain, R > L, did have inc pain RLE with weight bearing  -PC       User Key  (r) = Recorded By, (t) = Taken By, (c) = Cosigned By    Initials Name Provider Type    PC Brit Capps, PT Physical Therapist    Chante [place], RN Registered Nurse        Outcome Measures     Row Name 08/14/19 1651          How much help from another person do you currently need...    Turning from your back to your side while in flat bed without using bedrails?  3  -PC     Moving from lying on back to sitting on the side of a flat bed without bedrails?  3  -PC     Moving to and from a bed to a chair (including a wheelchair)?  2  -PC     Standing up from a chair using your arms (e.g., wheelchair, bedside chair)?  3  -PC     Climbing 3-5 steps with a railing?  1  -PC     To walk in hospital room?  2  -PC     AM-PAC 6 Clicks Score (PT)  14  -PC     Row Name 08/14/19 1651          Functional Assessment    Outcome Measure Options  AM-PAC 6 Clicks Basic Mobility (PT)  -PC       User Key  (r) =  Recorded By, (t) = Taken By, (c) = Cosigned By    Initials Name Provider Type    PC Brit Capps PT Physical Therapist        Physical Therapy Education     Title: PT OT SLP Therapies (Done)     Topic: Physical Therapy (Done)     Point: Mobility training (Done)     Learning Progress Summary           Patient Acceptance, E,D, DU,NR by PC at 8/14/2019  4:48 PM                   Point: Home exercise program (Done)     Learning Progress Summary           Patient Acceptance, E,D, DU,NR by PC at 8/14/2019  4:48 PM                   Point: Body mechanics (Done)     Learning Progress Summary           Patient Acceptance, E,D, DU,NR by PC at 8/14/2019  4:48 PM                   Point: Precautions (Done)     Learning Progress Summary           Patient Acceptance, E,D, DU,NR by PC at 8/14/2019  4:48 PM                               User Key     Initials Effective Dates Name Provider Type Discipline     04/03/18 -  Brit Capps PT Physical Therapist PT              PT Recommendation and Plan     Outcome Summary/Treatment Plan (PT)  Anticipated Discharge Disposition (PT): skilled nursing facility  Plan of Care Reviewed With: patient  Outcome Summary: pt is a pleasantly confused elderly female, s/p fall at home, with multiple pelvic fractures, she presents with impaired functional mobility and activity tolerance, she will benefit from PT to address, pt had difficutly bearing weight through RLE today, needed min/mod a x 2 to get out of bed and take a few steps with a walker. Pt would benefit from SNF at discharge to help return to previous level of fucntion, at present pt is a high fall risk     Time Calculation:   PT Charges     Row Name 08/14/19 1652             Time Calculation    Start Time  1623  -PC      Stop Time  1640  -PC      Time Calculation (min)  17 min  -PC      PT Received On  08/14/19  -PC      PT - Next Appointment  08/15/19  -PC      PT Goal Re-Cert Due Date  08/21/19  -PC        User Key  (r) =  Recorded By, (t) = Taken By, (c) = Cosigned By    Initials Name Provider Type    PC Brit Capps, PT Physical Therapist        Therapy Charges for Today     Code Description Service Date Service Provider Modifiers Qty    52387566995 HC PT EVAL MOD COMPLEXITY 2 8/14/2019 Brit Capps, PT GP 1    76198951027 HC PT THER PROC EA 15 MIN 8/14/2019 Brit Capps, PT GP 1    25071442560 HC PT THER SUPP EA 15 MIN 8/14/2019 Brit Capps, PT GP 1          PT G-Codes  Outcome Measure Options: AM-PAC 6 Clicks Basic Mobility (PT)  AM-PAC 6 Clicks Score (PT): 14    Brit Capps PT  8/14/2019

## 2019-08-14 NOTE — PROGRESS NOTES
Continued Stay Note  [facility]     Patient Name: Mary Lindsey  MRN: [id number]  Today's Date: 8/14/2019    Admit Date: 8/12/2019    Discharge Plan     Row Name 08/14/19 3368       Plan    Plan Comments  Per nursing note pt's son wants to discuss d/c planning. When speaking with daughter she reports son is not involved and we shouldn't speak with him. Spoke Erika with [employer], she reports CSW should try and contact son and attempt to get a joint agreement between pt's son and daughter.  left for Russ Lindsey (pt's son) [phone]. Son's number was given to CSW from daughter Michelle. ANN MARIE Mae    Row Name 08/14/19 1492       Plan    Plan  SNF/LTC; referrals pending     Patient/Family in Agreement with Plan  yes    Plan Comments  Spoke with pt's daughter Michelle [phone]. She reports she lives with pt in pt's house. Michelle reports she is pt's primary caregiver, pt has a son that isn't involved per daughter. She feels pt will need SNF and long term care at d/c. She would like a referral to [facility] as pt has been there in the past. Pt is open to other referrals if needed.Advised daughter to apply for guardianship and gave her address and information on how to apply.    CCP to follow…ANN MARIE Mae        Discharge Codes    No documentation.             Darby Davis

## 2019-08-14 NOTE — PLAN OF CARE
Problem: Patient Care Overview  Goal: Plan of Care Review   08/13/19 1517 08/14/19 0310   Coping/Psychosocial   Plan of Care Reviewed With patient --    Plan of Care Review   Progress improving --    OTHER   Outcome Summary --  Admitted on 8/12/19 for pelvic fx. Was started on tube feeding on 8/13/18 and has tolerated well. Some confustion at times and has attempted to get OOB numerous attempts. Pain well controlled with PRN pain medication.        Problem: Skin Injury Risk (Adult)  Goal: Skin Health and Integrity   08/13/19 1517   Skin Injury Risk (Adult)   Skin Health and Integrity making progress toward outcome       Problem: Pain, Chronic (Adult)  Goal: Acceptable Pain/Comfort Level and Functional Ability   08/13/19 1517   Pain, Chronic (Adult)   Acceptable Pain/Comfort Level and Functional Ability making progress toward outcome

## 2019-08-14 NOTE — PLAN OF CARE
Problem: Patient Care Overview  Goal: Plan of Care Review  Outcome: Ongoing (interventions implemented as appropriate)   08/14/19 5278   Coping/Psychosocial   Plan of Care Reviewed With patient   OTHER   Outcome Summary pt is a pleasantly confused elderly female, s/p fall at home, with multiple pelvic fractures, she presents with impaired functional mobility and activity tolerance, she will benefit from PT to address, pt had difficutly bearing weight through RLE today, needed min/mod a x 2 to get out of bed and take a few steps with a walker. Pt would benefit from SNF at discharge to help return to previous level of fucntion, at present pt is a high fall risk

## 2019-08-15 LAB
ANION GAP SERPL CALCULATED.3IONS-SCNC: 8.4 MMOL/L (ref 5–15)
BACTERIA UR QL AUTO: ABNORMAL /HPF
BASOPHILS # BLD AUTO: 0.03 10*3/MM3 (ref 0–0.2)
BASOPHILS NFR BLD AUTO: 0.2 % (ref 0–1.5)
BILIRUB UR QL STRIP: NEGATIVE
BUN BLD-MCNC: 17 MG/DL (ref 8–23)
BUN/CREAT SERPL: 23.3 (ref 7–25)
CALCIUM SPEC-SCNC: 8.3 MG/DL (ref 8.6–10.5)
CHLORIDE SERPL-SCNC: 101 MMOL/L (ref 98–107)
CLARITY UR: CLEAR
CO2 SERPL-SCNC: 21.6 MMOL/L (ref 22–29)
COLOR UR: YELLOW
CREAT BLD-MCNC: 0.73 MG/DL (ref 0.57–1)
DEPRECATED RDW RBC AUTO: 57.7 FL (ref 37–54)
EOSINOPHIL # BLD AUTO: 0.13 10*3/MM3 (ref 0–0.4)
EOSINOPHIL NFR BLD AUTO: 1 % (ref 0.3–6.2)
ERYTHROCYTE [DISTWIDTH] IN BLOOD BY AUTOMATED COUNT: 15.9 % (ref 12.3–15.4)
GFR SERPL CREATININE-BSD FRML MDRD: 75 ML/MIN/1.73
GLUCOSE BLD-MCNC: 92 MG/DL (ref 65–99)
GLUCOSE UR STRIP-MCNC: NEGATIVE MG/DL
HCT VFR BLD AUTO: 32.8 % (ref 34–46.6)
HGB BLD-MCNC: 10.5 G/DL (ref 12–15.9)
HGB UR QL STRIP.AUTO: NEGATIVE
HYALINE CASTS UR QL AUTO: ABNORMAL /LPF
IMM GRANULOCYTES # BLD AUTO: 0.09 10*3/MM3 (ref 0–0.05)
IMM GRANULOCYTES NFR BLD AUTO: 0.7 % (ref 0–0.5)
KETONES UR QL STRIP: NEGATIVE
LEUKOCYTE ESTERASE UR QL STRIP.AUTO: ABNORMAL
LYMPHOCYTES # BLD AUTO: 0.8 10*3/MM3 (ref 0.7–3.1)
LYMPHOCYTES NFR BLD AUTO: 6.1 % (ref 19.6–45.3)
MCH RBC QN AUTO: 31.9 PG (ref 26.6–33)
MCHC RBC AUTO-ENTMCNC: 32 G/DL (ref 31.5–35.7)
MCV RBC AUTO: 99.7 FL (ref 79–97)
MONOCYTES # BLD AUTO: 1.42 10*3/MM3 (ref 0.1–0.9)
MONOCYTES NFR BLD AUTO: 10.8 % (ref 5–12)
NEUTROPHILS # BLD AUTO: 10.63 10*3/MM3 (ref 1.7–7)
NEUTROPHILS NFR BLD AUTO: 81.2 % (ref 42.7–76)
NITRITE UR QL STRIP: NEGATIVE
NRBC BLD AUTO-RTO: 0 /100 WBC (ref 0–0.2)
NT-PROBNP SERPL-MCNC: 5550 PG/ML (ref 5–1800)
PH UR STRIP.AUTO: 8 [PH] (ref 5–8)
PLATELET # BLD AUTO: 360 10*3/MM3 (ref 140–450)
PMV BLD AUTO: 10.1 FL (ref 6–12)
POTASSIUM BLD-SCNC: 4.2 MMOL/L (ref 3.5–5.2)
PROT UR QL STRIP: NEGATIVE
RBC # BLD AUTO: 3.29 10*6/MM3 (ref 3.77–5.28)
RBC # UR: ABNORMAL /HPF
REF LAB TEST METHOD: ABNORMAL
SODIUM BLD-SCNC: 131 MMOL/L (ref 136–145)
SP GR UR STRIP: 1.02 (ref 1–1.03)
SQUAMOUS #/AREA URNS HPF: ABNORMAL /HPF
UROBILINOGEN UR QL STRIP: ABNORMAL
WBC NRBC COR # BLD: 13.1 10*3/MM3 (ref 3.4–10.8)
WBC UR QL AUTO: ABNORMAL /HPF

## 2019-08-15 PROCEDURE — 97110 THERAPEUTIC EXERCISES: CPT

## 2019-08-15 PROCEDURE — 80048 BASIC METABOLIC PNL TOTAL CA: CPT | Performed by: HOSPITALIST

## 2019-08-15 PROCEDURE — 87086 URINE CULTURE/COLONY COUNT: CPT | Performed by: HOSPITALIST

## 2019-08-15 PROCEDURE — 97535 SELF CARE MNGMENT TRAINING: CPT

## 2019-08-15 PROCEDURE — 85025 COMPLETE CBC W/AUTO DIFF WBC: CPT | Performed by: HOSPITALIST

## 2019-08-15 PROCEDURE — 83880 ASSAY OF NATRIURETIC PEPTIDE: CPT | Performed by: HOSPITALIST

## 2019-08-15 PROCEDURE — 81001 URINALYSIS AUTO W/SCOPE: CPT | Performed by: HOSPITALIST

## 2019-08-15 PROCEDURE — 97165 OT EVAL LOW COMPLEX 30 MIN: CPT

## 2019-08-15 PROCEDURE — 25010000002 CEFTRIAXONE PER 250 MG: Performed by: HOSPITALIST

## 2019-08-15 RX ADMIN — ASPIRIN 81 MG: 81 TABLET, COATED ORAL at 08:47

## 2019-08-15 RX ADMIN — OXYCODONE HYDROCHLORIDE 5 MG: 5 SOLUTION ORAL at 05:26

## 2019-08-15 RX ADMIN — CEFTRIAXONE SODIUM 1 G: 1 INJECTION, SOLUTION INTRAVENOUS at 15:43

## 2019-08-15 RX ADMIN — AMITRIPTYLINE HYDROCHLORIDE 10 MG: 10 TABLET, FILM COATED ORAL at 20:29

## 2019-08-15 RX ADMIN — OXYCODONE HYDROCHLORIDE 5 MG: 5 SOLUTION ORAL at 15:43

## 2019-08-15 RX ADMIN — PANTOPRAZOLE SODIUM 40 MG: 40 TABLET, DELAYED RELEASE ORAL at 08:47

## 2019-08-15 RX ADMIN — OXYCODONE HYDROCHLORIDE 5 MG: 5 SOLUTION ORAL at 20:30

## 2019-08-15 RX ADMIN — METOPROLOL TARTRATE 25 MG: 25 TABLET ORAL at 08:47

## 2019-08-15 RX ADMIN — LEVOTHYROXINE SODIUM 75 MCG: 75 TABLET ORAL at 08:47

## 2019-08-15 RX ADMIN — OXYCODONE HYDROCHLORIDE 5 MG: 5 SOLUTION ORAL at 10:48

## 2019-08-15 RX ADMIN — FLUOXETINE HYDROCHLORIDE 20 MG: 20 CAPSULE ORAL at 08:47

## 2019-08-15 NOTE — PLAN OF CARE
Problem: Patient Care Overview  Goal: Plan of Care Review   08/15/19 0797   Coping/Psychosocial   Plan of Care Reviewed With patient   OTHER   Outcome Summary SLow progress towards goals during PT. WBAT B LE. Able to take few steps bed>chair w/ mod A using RW, limited by pain and fatigue. Recommend DC to SNU.

## 2019-08-15 NOTE — PROGRESS NOTES
Continued Stay Note  [facility]     Patient Name: Mary Lindsey  MRN: [id number]  Today's Date: 8/15/2019    Admit Date: 8/12/2019    Discharge Plan     Row Name 08/15/19 1325       Plan    Plan  [facility] pending [facility] precert     Patient/Family in Agreement with Plan  yes    Plan Comments  Per [facility] can accept and [facility] precert has been started. ANN MARIE Mae        Discharge Codes    No documentation.             Darby Davis

## 2019-08-15 NOTE — NURSING NOTE
Spoke with Dr. Levi regarding patient's nutritional intake.  States that patient is to be on GI soft diet during the day, with tube feedings only infusing from 1800 to 0600 while patient is resting.  Orders placed in Epic at this time.

## 2019-08-15 NOTE — THERAPY TREATMENT NOTE
Patient Name: Mary Lindsey  : 1930    MRN: [id number]                              Today's Date: 8/15/2019       Admit Date: 2019    Visit Dx:     ICD-10-CM ICD-9-CM   1. Multiple closed fractures of pelvis without disruption of pelvic ring, initial encounter (CMS/[facility]) S32.82XA 808.44   2. Failure to thrive in adult R62.7 783.7   3. Multiple falls R29.6 V15.88     Patient Active Problem List   Diagnosis   • Pelvic fracture (CMS/[facility])     Past Medical History:   Diagnosis Date   • Arthritis    • CHF (congestive heart failure) (CMS/[facility])    • COPD (chronic obstructive pulmonary disease) (CMS/[facility])    • Coronary artery disease    • Dementia    • Depression    • Disease of thyroid gland    • GERD (gastroesophageal reflux disease)    • Hyperlipidemia    • Hypertension    • Pneumonia     aspiration     Past Surgical History:   Procedure Laterality Date   • CARDIAC SURGERY      stents x 2   • GTUBE INSERTION     • JOINT REPLACEMENT Bilateral     hip   • NASAL SINUS SURGERY       General Information     Row Name 08/15/19 1641 08/15/19 0842       PT Evaluation Time/Intention    Subjective Information  --  no complaints  -SK    Document Type  therapy note (daily note)  -AR  evaluation  -SK    Mode of Treatment  physical therapy  -AR  individual therapy;occupational therapy  -SK    Patient Effort  --  adequate  -SK    Comment  --  per nsg, pt very fatigued this am and more lethargic than yesterday   -SK    Row Name 08/15/19 1641 08/15/19 0842       General Information    Patient Profile Reviewed?  yes  -AR  yes  -SK    Onset of Illness/Injury or Date of Surgery  --  19  -SK    Referring Physician  --  Dr Levi  -SK    Patient Observations  --  alert;cooperative;agree to therapy  -SK    Patient/Family Observations  --  difficulty arousing this session, vcs to maintain opened eyes  -SK    General Observations of Patient  --  supint in bed with no signs of distress noted   -SK    Prior Level of Function  --  --  unknown as pt poor historian   -SK    Equipment Currently Used at Home  --  other (see comments) unknown at this time   -SK    Existing Precautions/Restrictions  fall WBAT B LE  -AR  fall  -SK    Row Name 08/15/19 1641 08/15/19 0842       Cognitive Assessment/Intervention- PT/OT    Orientation Status (Cognition)  oriented to;person;place  -AR  oriented to;person;disoriented to;place;situation;time;verbal cues/prompts needed for orientation  -SK    Follows Commands (Cognition)  --  follows one step commands;25-49% accuracy  -SK    Safety Deficit (Cognitive)  --  moderate deficit;judgment;insight into deficits/self awareness  -SK    Row Name 08/15/19 0842          Safety Issues, Functional Mobility    Safety Issues Affecting Function (Mobility)  insight into deficits/self awareness;judgment  -SK     Impairments Affecting Function (Mobility)  balance;cognition;strength  -SK       User Key  (r) = Recorded By, (t) = Taken By, (c) = Cosigned By    Initials Name Provider Type    AR Sarah Pham, PT Physical Therapist    SK Dodie Coffey, OT Occupational Therapist        Mobility     Row Name 08/15/19 1641 08/15/19 0842       Bed Mobility Assessment/Treatment    Bed Mobility Assessment/Treatment  --  supine-sit;sit-supine  -SK    Supine-Sit [place] (Bed Mobility)  moderate assist (50% patient effort)  -AR  moderate assist (50% patient effort)  -SK    Sit-Supine [place] (Bed Mobility)  not tested  -AR  moderate assist (50% patient effort)  -SK    Assistive Device (Bed Mobility)  bed rails;head of bed elevated  -AR  --    Row Name 08/15/19 1641          Sit-Stand Transfer    Sit-Stand [place] (Transfers)  moderate assist (50% patient effort)  -AR     Assistive Device (Sit-Stand Transfers)  walker, front-wheeled  -AR     Row Name 08/15/19 1641          Gait/Stairs Assessment/Training    [place] Level (Gait)  moderate assist (50% patient effort);2 person assist  -AR     Assistive Device (Gait)  walker,  front-wheeled  -AR     Distance in Feet (Gait)  2 ft limited by pain   -AR     Deviations/Abnormal Patterns (Gait)  antalgic;griffin decreased;festinating/shuffling  -AR     Bilateral Gait Deviations  weight shift ability decreased;heel strike decreased  -AR       User Key  (r) = Recorded By, (t) = Taken By, (c) = Cosigned By    Initials Name Provider Type    AR Sarah Pham, PT Physical Therapist    Dodie Kasper, GINI Occupational Therapist        Obj/Interventions     [facility] Name 08/15/19 0842          General ROM    GENERAL ROM COMMENTS  appears WFL   -[facility] Name 08/15/19 0842          MMT (Manual Muscle Testing)    General MMT Comments  grossly 3-/5, not formally assessed   -[facility] Name 08/15/19 1642          Static Sitting Balance    Level of [place] (Unsupported Sitting, Static Balance)  supervision  -AR     Sitting Position (Unsupported Sitting, Static Balance)  sitting on edge of bed  -AR     Time Able to Maintain Position (Unsupported Sitting, Static Balance)  2 to 3 minutes  -[facility] Name 08/15/19 0842          Sensory Assessment/Intervention    Sensory General Assessment  no sensation deficits identified  -SK       User Key  (r) = Recorded By, (t) = Taken By, (c) = Cosigned By    Initials Name Provider Type    AR Sarah Pham, PT Physical Therapist    Dodie Kasper, OT Occupational Therapist        Goals/Plan    No documentation.       Clinical Impression     [facility] Name 08/15/19 1643          Pain Assessment    Additional Documentation  Pain Scale: Numbers Pre/Post-Treatment (Group)  -AR     Row Name 08/15/19 1643          Pain Scale: Numbers Pre/Post-Treatment    Pain Scale: Numbers, Pretreatment  5/10  -AR     Pain Scale: Numbers, Post-Treatment  6/10  -AR     Pain Location - Side  Bilateral  -AR     Pain Location  hip  -AR     Pain Intervention(s)  Medication (See MAR);Repositioned  -AR     Row Name 08/15/19 1644 08/15/19 0970       Plan of Care Review    Plan of Care  Reviewed With  patient  -AR  patient  -SK    Row Name 08/15/19 0842          Plan of Care Review    Plan of Care Reviewed With  patient  -SK     Row Name 08/15/19 1643          Physical Therapy Clinical Impression    Rehab Potential (PT Clinical Summary)  good, to achieve stated therapy goals  -AR     Row Name 08/15/19 1643          Vital Signs    O2 Delivery Pre Treatment  room air  -AR     Row Name 08/15/19 1643 08/15/19 0842       Positioning and Restraints    Pre-Treatment Position  in bed  -AR  in bed  -SK    Post Treatment Position  chair  -AR  bed  -SK    In Bed  --  call light within reach;encouraged to call for assist;exit alarm on;notified nsg  -SK    In Chair  notified nsg;reclined;sitting;call light within reach;encouraged to call for assist;exit alarm on  -AR  --      User Key  (r) = Recorded By, (t) = Taken By, (c) = Cosigned By    Initials Name Provider Type    aSrah García, PT Physical Therapist    Dodie Kasper OT Occupational Therapist        Outcome Measures     Row Name 08/15/19 1643          How much help from another person do you currently need...    Turning from your back to your side while in flat bed without using bedrails?  3  -AR     Moving from lying on back to sitting on the side of a flat bed without bedrails?  2  -AR     Moving to and from a bed to a chair (including a wheelchair)?  2  -AR     Standing up from a chair using your arms (e.g., wheelchair, bedside chair)?  2  -AR     Climbing 3-5 steps with a railing?  1  -AR     To walk in hospital room?  2  -AR     AM-PAC 6 Clicks Score (PT)  12  -AR     Row Name 08/15/19 0900          Functional Assessment    Outcome Measure Options  AM-PAC 6 Clicks Daily Activity (OT)  -SK       User Key  (r) = Recorded By, (t) = Taken By, (c) = Cosigned By    Initials Name Provider Type    Sarah García PT Physical Therapist    Dodie Kasper OT Occupational Therapist        Physical Therapy Education     Title: PT OT SLP  Therapies (In Progress)     Topic: Physical Therapy (In Progress)     Point: Mobility training (In Progress)     Learning Progress Summary           Patient Acceptance, E, NR by AR at 8/15/2019  4:44 PM    Acceptance, E,D, DU,NR by PC at 8/14/2019  4:48 PM                   Point: Home exercise program (In Progress)     Learning Progress Summary           Patient Acceptance, E, NR by AR at 8/15/2019  4:44 PM    Acceptance, E,D, DU,NR by PC at 8/14/2019  4:48 PM                   Point: Body mechanics (In Progress)     Learning Progress Summary           Patient Acceptance, E, NR by AR at 8/15/2019  4:44 PM    Acceptance, E,D, DU,NR by PC at 8/14/2019  4:48 PM                   Point: Precautions (In Progress)     Learning Progress Summary           Patient Acceptance, E, NR by AR at 8/15/2019  4:44 PM    Acceptance, E,D, DU,NR by PC at 8/14/2019  4:48 PM                               User Key     Initials Effective Dates Name Provider Type Discipline     04/03/18 -  Brit Capps PT Physical Therapist PT    AR 04/03/18 -  Sarah Pham PT Physical Therapist PT              PT Recommendation and Plan     Plan of Care Reviewed With: patient  Outcome Summary: SLow progress towards goals during PT.  WBAT B LE. Able to take few steps bed>chair w/ mod A using RW, limited by pain and fatigue.  Recommend DC to SNU.       Time Calculation:   PT Charges     Row Name 08/15/19 1643             Time Calculation    Start Time  1628  -AR      Stop Time  1644  -AR      Time Calculation (min)  16 min  -AR      PT Received On  08/15/19  -AR      PT - Next Appointment  08/16/19  -AR        User Key  (r) = Recorded By, (t) = Taken By, (c) = Cosigned By    Initials Name Provider Type    AR Sarah Pham PT Physical Therapist        Therapy Charges for Today     Code Description Service Date Service Provider Modifiers Qty    85991711807 HC PT THER PROC EA 15 MIN 8/15/2019 Sarah Pham PT GP 1    85036875364 HC PT THER  SUPP EA 15 MIN 8/15/2019 Sarah Pham, PT GP 1          PT G-Codes  Outcome Measure Options: AM-PAC 6 Clicks Daily Activity (OT)  AM-PAC 6 Clicks Score (PT): 12  AM-PAC 6 Clicks Score (OT): 12    Sarah Pham, PT  8/15/2019

## 2019-08-15 NOTE — PROGRESS NOTES
Continued Stay Note  [facility]     Patient Name: Mary Lindsey  MRN: [id number]  Today's Date: 8/15/2019    Admit Date: 8/12/2019    Discharge Plan     Row Name 08/15/19 1342       Plan    Plan Comments  [facility] ambulance scheduled for Friday 8/16 at 2:30pm. Packet in cubby. ANN MARIE Mae    Row Name 08/15/19 1328       Plan    Plan  [facility]; [facility] precert obtained     Patient/Family in Agreement with Plan  yes    Plan Comments  Per Tiana bailey approved. Bed available on Friday 8/16 ANN MARIE Mae    Row Name 08/15/19 1325       Plan    Plan  [facility] pending [facility] precert     Patient/Family in Agreement with Plan  yes    Plan Comments  Per Tiana Allison can accept and [facility] precert has been started. ANN MARIE Mae        Discharge Codes    No documentation.             Darby Davis

## 2019-08-15 NOTE — THERAPY EVALUATION
Acute Care - Occupational Therapy Initial Evaluation  [facility]     Patient Name: Mary Lindsey  : 1930  MRN: [id number]  Today's Date: 8/15/2019  Onset of Illness/Injury or Date of Surgery: 19  Date of Referral to OT: 19  Referring Physician: Dr Levi    Admit Date: 2019       ICD-10-CM ICD-9-CM   1. Multiple closed fractures of pelvis without disruption of pelvic ring, initial encounter (CMS/[facility]) S32.82XA 808.44   2. Failure to thrive in adult R62.7 783.7   3. Multiple falls R29.6 V15.88     Patient Active Problem List   Diagnosis   • Pelvic fracture (CMS/[facility])     Past Medical History:   Diagnosis Date   • Arthritis    • CHF (congestive heart failure) (CMS/[facility])    • COPD (chronic obstructive pulmonary disease) (CMS/[facility])    • Coronary artery disease    • Dementia    • Depression    • Disease of thyroid gland    • GERD (gastroesophageal reflux disease)    • Hyperlipidemia    • Hypertension    • Pneumonia     aspiration     Past Surgical History:   Procedure Laterality Date   • CARDIAC SURGERY      stents x 2   • GTUBE INSERTION     • JOINT REPLACEMENT Bilateral     hip   • NASAL SINUS SURGERY            OT ASSESSMENT FLOWSHEET (last 12 hours)      Occupational Therapy Evaluation     Row Name 08/15/19 0842                   OT Evaluation Time/Intention    Subjective Information  no complaints  -SK        Document Type  evaluation  -SK        Mode of Treatment  individual therapy;occupational therapy  -SK        Patient Effort  adequate  -SK        Comment  per nsg, pt very fatigued this am and more lethargic than yesterday   -SK           General Information    Patient Profile Reviewed?  yes  -SK        Onset of Illness/Injury or Date of Surgery  19  -SK        Referring Physician  Dr Levi  -SK        Patient Observations  alert;cooperative;agree to therapy  -SK        Patient/Family Observations  difficulty arousing this session, vcs to maintain opened eyes  -SK        General  Observations of Patient  supint in bed with no signs of distress noted   -SK        Prior Level of Function  -- unknown as pt poor historian   -SK        Equipment Currently Used at Home  other (see comments) unknown at this time   -SK        Existing Precautions/Restrictions  fall  -SK           Cognitive Assessment/Intervention- PT/OT    Orientation Status (Cognition)  oriented to;person;disoriented to;place;situation;time;verbal cues/prompts needed for orientation  -SK        Follows Commands (Cognition)  follows one step commands;25-49% accuracy  -SK        Safety Deficit (Cognitive)  moderate deficit;judgment;insight into deficits/self awareness  -SK           Safety Issues, Functional Mobility    Safety Issues Affecting Function (Mobility)  insight into deficits/self awareness;judgment  -SK        Impairments Affecting Function (Mobility)  balance;cognition;strength  -SK           Bed Mobility Assessment/Treatment    Bed Mobility Assessment/Treatment  supine-sit;sit-supine  -SK        Supine-Sit [place] (Bed Mobility)  moderate assist (50% patient effort)  -SK        Sit-Supine [place] (Bed Mobility)  moderate assist (50% patient effort)  -SK           Functional Mobility    Functional Mobility- Comment  NT   -SK           ADL Assessment/Intervention    BADL Assessment/Intervention  bathing;upper body dressing;lower body dressing;grooming;toileting  -SK           Lower Body Dressing Assessment/Training    Lower Body Dressing [place] Level  don;socks;dependent (less than 25% patient effort)  -SK        Lower Body Dressing Position  edge of bed sitting  -SK           Grooming Assessment/Training    [place] Level (Grooming)  wash face, hands;minimum assist (75% patient effort)  -SK        Grooming Position  edge of bed sitting  -SK        Comment (Grooming)  vcs to perform, increased time   -SK           Toileting Assessment/Training    [place] Level (Toileting)  dependent (less than 25%  patient effort)  -SK           General ROM    GENERAL ROM COMMENTS  appears WFL   -SK           MMT (Manual Muscle Testing)    General MMT Comments  grossly 3-/5, not formally assessed   -SK           Motor Assessment/Interventions    Additional Documentation  Balance (Group);Balance Interventions (Group);Therapeutic Exercise (Group);Therapeutic Exercise Interventions (Group)  -SK           Sensory Assessment/Intervention    Sensory General Assessment  no sensation deficits identified  -SK           Positioning and Restraints    Pre-Treatment Position  in bed  -SK        Post Treatment Position  bed  -SK        In Bed  call light within reach;encouraged to call for assist;exit alarm on;notified nsg  -SK           Pain Assessment    Additional Documentation  Pain Scale 2: FACES Pre/Post-Treatment (Group)  -SK           Pain Scale 2: FACES Pre/Post-Treatment    Pain 2: FACES Scale, Pretreatment  2-->hurts little bit  -SK        Pain 2: FACES Scale, Post-Treatment  4-->hurts little more  -SK           Plan of Care Review    Plan of Care Reviewed With  patient  -SK           Clinical Impression (OT)    Date of Referral to OT  08/14/19  -SK        OT Diagnosis  pt is an 87YO pleasantly confused elderly female, s/p fall at home, with multiple pelvic fractures, who presents to OT with generalized weakness, decreaesd endurance, decreased funcitonal mobility, decreased cognition resulting in decreased independence with ADLs.    -SK        Functional Level at Time of Evaluation (OT Eval)  Pt perform supine to sit at EOB with Mod A, sit at EOB with CGA/Min A and vcs to maintain sitting balance, pt wash face with Min A and increased time/vcs.  Pt req vcs to open eyes and to follow commands this session.  Pt will benefit from skilled OT to address deficits and increase independence in ADLs.  Pt benefit form additional therapy at SNF upon d/c.    -SK        Criteria for Skilled Therapeutic Interventions Met (OT Eval)  yes  -SK         Rehab Potential (OT Eval)  good, to achieve stated therapy goals  -SK        Therapy Frequency (OT Eval)  5 times/wk  -SK        Care Plan Review (OT)  evaluation/treatment results reviewed;care plan/treatment goals reviewed;patient/other agree to care plan  -SK        Anticipated Discharge Disposition (OT)  skilled nursing facility  -SK           OT Goals    Transfer Goal Selection (OT)  transfer, OT goal 1  -SK        Bathing Goal Selection (OT)  bathing, OT goal 1  -SK        Dressing Goal Selection (OT)  dressing, OT goal 1  -SK        Grooming Goal Selection (OT)  grooming, OT goal 1  -SK        Strength Goal Selection (OT)  strength, OT goal 1  -SK        Additional Documentation  Strength Goal Selection (OT) (Row);Grooming Goal Selection (OT) (Row)  -SK           Transfer Goal 1 (OT)    Activity/Assistive Device (Transfer Goal 1, OT)  sit-to-stand/stand-to-sit;bed-to-chair/chair-to-bed;toilet;walker, rolling  -SK        [place] Level/Cues Needed (Transfer Goal 1, OT)  minimum assist (75% or more patient effort)  -SK        Time Frame (Transfer Goal 1, OT)  1 week  -SK        Progress/Outcome (Transfer Goal 1, OT)  goal ongoing  -SK           Bathing Goal 1 (OT)    Activity/Assistive Device (Bathing Goal 1, OT)  upper body bathing;lower body bathing  -SK        [place] Level/Cues Needed (Bathing Goal 1, OT)  minimum assist (75% or more patient effort)  -SK        Time Frame (Bathing Goal 1, OT)  1 week  -SK        Progress/Outcomes (Bathing Goal 1, OT)  goal ongoing  -SK           Dressing Goal 1 (OT)    Activity/Assistive Device (Dressing Goal 1, OT)  upper body dressing;lower body dressing  -SK        [place]/Cues Needed (Dressing Goal 1, OT)  minimum assist (75% or more patient effort)  -SK        Time Frame (Dressing Goal 1, OT)  1 week  -SK        Progress/Outcome (Dressing Goal 1, OT)  goal ongoing  -SK           Grooming Goal 1 (OT)    Activity/Device (Grooming Goal 1, OT)  wash face,  hands;oral care  -SK        [place] (Grooming Goal 1, OT)  set-up required  -SK        Time Frame (Grooming Goal 1, OT)  1 week  -SK        Progress/Outcome (Grooming Goal 1, OT)  goal ongoing  -SK           Strength Goal 1 (OT)    Strength Goal 1 (OT)  pt perform BUE AROM ex to increased strength to 3+/5 to assist with ADl activity   -SK        Time Frame (Strength Goal 1, OT)  1 week  -SK        Progress/Outcome (Strength Goal 1, OT)  goal ongoing  -SK          User Key  (r) = Recorded By, (t) = Taken By, (c) = Cosigned By    Initials Name Effective Dates    SK Dodie Coffey OT 02/25/19 -          Occupational Therapy Education     Title: PT OT SLP Therapies (In Progress)     Topic: Occupational Therapy (In Progress)     Point: ADL training (Done)     Description: Instruct learner(s) on proper safety adaptation and remediation techniques during self care or transfers.   Instruct in proper use of assistive devices.    Learning Progress Summary           Patient Acceptance, E, VU by SK at 8/15/2019  9:35 AM    Comment:  educate pt on safety with ADLs, dme                   Point: Precautions (Done)     Description: Instruct learner(s) on prescribed precautions during self-care and functional transfers.    Learning Progress Summary           Patient Acceptance, E, VU by SK at 8/15/2019  9:35 AM    Comment:  educate pt on safety with ADLs, dme                               User Key     Initials Effective Dates Name Provider Type Discipline    SK 02/25/19 -  Dodie Coffey, OT Occupational Therapist OT                  OT Recommendation and Plan  Outcome Summary/Treatment Plan (OT)  Anticipated Discharge Disposition (OT): skilled nursing facility  Therapy Frequency (OT Eval): 5 times/wk  Plan of Care Review  Plan of Care Reviewed With: patient  Plan of Care Reviewed With: patient  Outcome Summary: pt is an 87YO pleasantly confused elderly female, s/p fall at home, with multiple pelvic fractures, who presents to  OT with generalized weakness, decreaesd endurance, decreased funcitonal mobility, decreased cognition resulting in decreased independence with ADLs.  Pt perform supine to sit at EOB with Mod A, sit at EOB with CGA/Min A and vcs to maintain sitting balance, pt wash face with Min A and increased time/vcs.  Pt req vcs to open eyes and to follow commands this session.  Pt will benefit from skilled OT to address deficits and increase independence in ADLs.  Pt benefit form additional therapy at SNF upon d/c.      Outcome Measures     Row Name 08/15/19 0900             How much help from another is currently needed...    Putting on and taking off regular lower body clothing?  1  -SK      Bathing (including washing, rinsing, and drying)  2  -SK      Toileting (which includes using toilet bed pan or urinal)  1  -SK      Putting on and taking off regular upper body clothing  2  -SK      Taking care of personal grooming (such as brushing teeth)  3  -SK      Eating meals  3  -SK      AM-PAC 6 Clicks Score (OT)  12  -SK         Functional Assessment    Outcome Measure Options  AM-PAC 6 Clicks Daily Activity (OT)  -SK        User Key  (r) = Recorded By, (t) = Taken By, (c) = Cosigned By    Initials Name Provider Type    Dodie Kasper OT Occupational Therapist          Time Calculation:   Time Calculation- OT     Row Name 08/15/19 0935             Time Calculation- OT    OT Start Time  0821  -SK      OT Stop Time  0842  -SK      OT Time Calculation (min)  21 min  -SK      Total Timed Code Minutes- OT  14 minute(s)  -SK      OT Goal Re-Cert Due Date  08/22/19  -SK        User Key  (r) = Recorded By, (t) = Taken By, (c) = Cosigned By    Initials Name Provider Type    Dodie Kasper OT Occupational Therapist        Therapy Charges for Today     Code Description Service Date Service Provider Modifiers Qty    [id number]  OT EVAL LOW COMPLEXITY 2 8/15/2019 Dodie Coffey OT GO 1    [id number]  OT SELF CARE/MGMT/TRAIN EA  15 MIN 8/15/2019 Dodie Coffey, OT GO 1               Dodie Coffey, OT  8/15/2019

## 2019-08-15 NOTE — PROGRESS NOTES
"Daily progress note    Chief complaint  Doing same  No new complaints  Family at bedside    History of present illness  88-year-old white female with history of COPD CHF coronary artery disease hypertension hyperlipidemia hypothyroidism also have dementia with severe osteoarthritis osteoporosis who was a nursing home resident brought to the emergency room after mechanical fall yesterday with bilateral hip pain and pelvic pain.  Patient stated that she has been falling a lot lately without losing consciousness but mainly she lost her balance and fell.  Patient denies any chest pain increased shortness of breath dizziness.  Patient hurting more on the left hip than the right.  Patient work-up in ER revealed multiple pelvic fractures admit for management     REVIEW OF SYSTEMS  Unable to obtain     PHYSICAL EXAM  Blood pressure 145/59, pulse (!) 46, temperature 97.5 °F (36.4 °C), temperature source Axillary, resp. rate 16, height 175.3 cm (69\"), weight 48 kg (105 lb 13.1 oz), SpO2 96 %.    GENERAL: Chronically ill-appearing, disheveled with multiple bruises on her extremities  HENT: NCAT, neck supple, trachea midline  EYES: no scleral icterus, PERRL, normal conjunctiva  CV: regular rhythm, regular rate, no murmur  RESPIRATORY: unlabored effort, CTAB  ABDOMEN: soft, non-tender, non-distended, bowel sounds present.  There is a G-tube present with no signs of infection or leakage.  MUSCULOSKELETAL: no gross deformity, TTP mild over R hip, slight pain with internal and external rotation.patient was unable to ambulate on her own even with a walker.  The pain in the right hip contributed to some unstable weightbearing and she had to be supported fully by one other person   nEURO: alert,  sensory and motor function of extremities grossly intact, speech clear, mental status normal/baseline. Pt is oriented but inaccurate with some answers and repeatative  SKIN: warm, dry, no rash, multiple bruising to the BLE.   PSYCH:  " Appropriate mood and affect    LAB RESULTS  Lab Results (last 24 hours)     Procedure Component Value Units Date/Time    BNP [[id number]]  (Abnormal) Collected:  08/15/19 0401    Specimen:  Blood from Arm, Left Updated:  08/15/19 0442     proBNP 5,550.0 pg/mL     Narrative:       Among patients with dyspnea, NT-proBNP is highly sensitive for the detection of acute congestive heart failure. In addition NT-proBNP of <300 pg/ml effectively rules out acute congestive heart failure with 99% negative predictive value.    Basic Metabolic Panel [[id number]]  (Abnormal) Collected:  08/15/19 0401    Specimen:  Blood from Arm, Left Updated:  08/15/19 0441     Glucose 92 mg/dL      BUN 17 mg/dL      Creatinine 0.73 mg/dL      Sodium 131 mmol/L      Potassium 4.2 mmol/L      Chloride 101 mmol/L      CO2 21.6 mmol/L      Calcium 8.3 mg/dL      eGFR Non African Amer 75 mL/min/1.73      BUN/Creatinine Ratio 23.3     Anion Gap 8.4 mmol/L     Narrative:       GFR Normal >60  Chronic Kidney Disease <60  Kidney Failure <15    CBC & Differential [[id number]] Collected:  08/15/19 0401    Specimen:  Blood Updated:  08/15/19 0417    Narrative:       The following orders were created for panel order CBC & Differential.  Procedure                               Abnormality         Status                     ---------                               -----------         ------                     CBC Auto Differential[[id number]]        Abnormal            Final result                 Please view results for these tests on the individual orders.    CBC Auto Differential [[id number]]  (Abnormal) Collected:  08/15/19 0401    Specimen:  Blood from Arm, Left Updated:  08/15/19 0417     WBC 13.10 10*3/mm3      RBC 3.29 10*6/mm3      Hemoglobin 10.5 g/dL      Hematocrit 32.8 %      MCV 99.7 fL      MCH 31.9 pg      MCHC 32.0 g/dL      RDW 15.9 %      RDW-SD 57.7 fl      MPV 10.1 fL      Platelets 360 10*3/mm3      Neutrophil % 81.2 %      Lymphocyte % 6.1 %       Monocyte % 10.8 %      Eosinophil % 1.0 %      Basophil % 0.2 %      Immature Grans % 0.7 %      Neutrophils, Absolute 10.63 10*3/mm3      Lymphocytes, Absolute 0.80 10*3/mm3      Monocytes, Absolute 1.42 10*3/mm3      Eosinophils, Absolute 0.13 10*3/mm3      Basophils, Absolute 0.03 10*3/mm3      Immature Grans, Absolute 0.09 10*3/mm3      nRBC 0.0 /100 WBC         Imaging Results (last 24 hours)     ** No results found for the last 24 hours. **          Current Facility-Administered Medications:   •  albuterol (PROVENTIL) nebulizer solution 0.083% 2.5 mg/3mL, 2.5 mg, Nebulization, Q6H PRN, Melecio Levi MD  •  amitriptyline (ELAVIL) tablet 10 mg, 10 mg, Per G Tube, Nightly, Melecio Levi MD, 10 mg at 08/14/19 2152  •  aspirin EC tablet 81 mg, 81 mg, Oral, Daily, Melecio Levi MD, 81 mg at 08/15/19 0847  •  busPIRone (BUSPAR) tablet 10 mg, 10 mg, Per G Tube, TID PRN, Melecio Levi MD  •  cefTRIAXone (ROCEPHIN) IVPB 1 g, 1 g, Intravenous, Q24H, Melecio Levi MD, Stopped at 08/14/19 2252  •  FLUoxetine (PROzac) capsule 20 mg, 20 mg, Per G Tube, Daily, Melecio Levi MD, 20 mg at 08/15/19 0847  •  levothyroxine (SYNTHROID, LEVOTHROID) tablet 75 mcg, 75 mcg, Per G Tube, Daily, Melecio Levi MD, 75 mcg at 08/15/19 0847  •  lidocaine (XYLOCAINE) 1 % injection 10 mL, 10 mL, Infiltration, Once, Tashia Pinto MD  •  methylPREDNISolone acetate (DEPO-medrol) injection 40 mg, 40 mg, Intra-articular, Once, Tashia Pinto MD  •  metoprolol tartrate (LOPRESSOR) tablet 25 mg, 25 mg, Per G Tube, Q12H, Melecio Levi MD, 25 mg at 08/15/19 0847  •  oxyCODONE (ROXICODONE) 5 MG/5ML solution 5 mg, 5 mg, Oral, Q4H PRN, Melecio Levi MD, 5 mg at 08/15/19 1048  •  pantoprazole (PROTONIX) EC tablet 40 mg, 40 mg, Oral, Daily, Melecio Levi MD, 40 mg at 08/15/19 0847  •  [COMPLETED] Insert peripheral IV, , , Once **AND** sodium chloride 0.9 % flush 10 mL, 10 mL, Intravenous, PRN, Darius Esteves MD      ASSESSMENT  Multiple pelvic fractures  Acute UTI  Status post fall  Bilateral hip pain with endoprosthesis in the past  Dysphagia status post G-tube on tube feeding at nighttime  COPD  Congestive heart failure  Coronary artery disease  Anxiety disorder  Depression  Dementia  Hypertension   Hyperlipidemia  Hypothyroidism  Osteoarthritis  Osteoporosis  Severe malnutrition  Gastroesophageal reflux disease    PLAN  CPM  Nonoperative treatment  Pain management  IV antibiotics  Tube feeding 6 PM to 6 AM and encourage p.o. intake and daytime  Orthopedic surgery consult pending  Continue nursing home medications and adjust the doses  Stress ulcer DVT prophylaxis  Supportive care  DNR  PT OT  Discussed with patient's son who is a pharmacist  Nursing home in a.m. if okay with all    MITCHELL PRIETO MD

## 2019-08-15 NOTE — PROGRESS NOTES
Adult Nutrition  Assessment/PES    Patient Name:  Mary Lindsey  YOB: 1930  MRN: [id number]  Admit Date:  8/12/2019    Assessment Date:  8/15/2019    Follow up. TF continue per home regimen. Isosource 1.5 @ 45 cc/hr.  Reason for Assessment     Row Name 08/15/19 1138          Reason for Assessment    Reason For Assessment  follow-up protocol;TF/PN             Labs/Tests/Procedures/Meds     Row Name 08/15/19 1138          Labs/Procedures/Meds    Lab Results Reviewed  reviewed, pertinent        Diagnostic Tests/Procedures    Diagnostic Test/Procedure Reviewed  reviewed, pertinent        Medications    Pertinent Medications Reviewed  reviewed, pertinent         Physical Findings     Row Name 08/15/19 1138          Physical Findings    Gastrointestinal  feeding tube     Tubes  PEG           Nutrition Prescription Ordered     Row Name 08/15/19 1138          Nutrition Prescription PO    Current PO Diet  Clear Liquid        Nutrition Prescription EN    Enteral Route  PEG     Product  Isosource 1.5 (Jevity 1.5)     TF Delivery Method  Continuous     Continuous TF Goal Rate (mL/hr)  45 mL/hr     Water flush (mL)   30 mL     Water Flush Frequency  Every 4 hours         Evaluation of Received Nutrient/Fluid Intake     Row Name 08/15/19 1139          Calories Evaluation    Enteral Calories (kcal)  1620     % of Kcal Needs  100        Protein Evaluation    Enteral Protein (gm)  73     % of Protein Needs  100        Fluid Intake Evaluation    Enteral (Free Water) Fluid (mL)  820     Free Water Flush Fluid (mL)  180     Total Free Water Intake (mL)  1000 mL           Problem/Interventions:    Intervention Goal     Row Name 08/15/19 1141          Intervention Goal    General  Maintain nutrition;Nutrition support treatment     TF/PN  Maintain TF/PN;Tolerate TF at goal     Weight  Appropriate weight gain         Nutrition Intervention     Row Name 08/15/19 1149          Nutrition Intervention    /[facility]  Care  plan reviewd;Follow Tx progress         Nutrition Prescription     Row Name 08/15/19 1140          Nutrition Prescription EN    Enteral Prescription  Continue same protocol         Education/Evaluation     Row Name 08/15/19 1140          Education    Education  Will Instruct as appropriate        Monitor/Evaluation    Monitor  Per protocol           Electronically signed by:  Elsy Bowen RD  08/15/19 11:40 AM

## 2019-08-15 NOTE — PROGRESS NOTES
Continued Stay Note  [facility]     Patient Name: Mary Lindsey  MRN: [id number]  Today's Date: 8/15/2019    Admit Date: 8/12/2019    Discharge Plan     Row Name 08/15/19 1328       Plan    Plan  [facility]; [facility] precert obtained     Patient/Family in Agreement with Plan  yes    Plan Comments  Per Tiana bailey approved. Bed available on Friday 8/16 ANN MARIE Mae    Row Name 08/15/19 1325       Plan    Plan  [facility] Allison pending [facility] precert     Patient/Family in Agreement with Plan  yes    Plan Comments  Per Tiana Allison can accept and [facility] precert has been started. ANN MARIE Mae        Discharge Codes    No documentation.             Darby Davis

## 2019-08-15 NOTE — PLAN OF CARE
Problem: Fall Risk (Adult)  Goal: Absence of Fall  Outcome: Ongoing (interventions implemented as appropriate)      Problem: Patient Care Overview  Goal: Plan of Care Review  Outcome: Ongoing (interventions implemented as appropriate)   08/15/19 1926   Coping/Psychosocial   Plan of Care Reviewed With patient;son;family   Plan of Care Review   Progress no change   OTHER   Outcome Summary 88/F presenting with pelvic fractures via ED on 8/12/19. Still very drowsy throughout shift. Tube feedings switched to nocturnal delievery only per family request. Oxycodone elixir for pain q4h prn. Q2 turns, Purewick in place for urine capture. UA sent to Lab at end of shift. Recommended SNU on D/C.     Goal: Individualization and Mutuality  Outcome: Ongoing (interventions implemented as appropriate)    Goal: Discharge Needs Assessment  Outcome: Ongoing (interventions implemented as appropriate)    Goal: Interprofessional Rounds/Family Conf  Outcome: Ongoing (interventions implemented as appropriate)      Problem: Skin Injury Risk (Adult)  Goal: Skin Health and Integrity  Outcome: Ongoing (interventions implemented as appropriate)      Problem: Pain, Chronic (Adult)  Goal: Acceptable Pain/Comfort Level and Functional Ability  Outcome: Ongoing (interventions implemented as appropriate)      Problem: Nutrition, Enteral (Adult)  Goal: Signs and Symptoms of Listed Potential Problems Will be Absent, Minimized or Managed (Nutrition, Enteral)  Outcome: Ongoing (interventions implemented as appropriate)

## 2019-08-15 NOTE — PROGRESS NOTES
Continued Stay Note  [facility]     Patient Name: Mary Lindsey  MRN: [id number]  Today's Date: 8/15/2019    Admit Date: 8/12/2019    Discharge Plan     Row Name 08/15/19 0916       Plan    Plan  SNF/LTC; referrals pending     Patient/Family in Agreement with Plan  yes    Plan Comments  Spoke with pt's son Russ Lindsey [phone] via phone. He was unaware that his mother was at [facility]. He reports that he is in agreement with pt going to SNF and then long term care. He reports pt has some assets and could possibly private pay for a short period of time. I will have [facility] also contact him. Also gave him information on how to apply for guardianship. VM left for Tiana/Signature with son's information. CCP to follow and assist as needed. ANN MARIE Mae        Discharge Codes    No documentation.             Darby Davis

## 2019-08-15 NOTE — PLAN OF CARE
Problem: Patient Care Overview  Goal: Plan of Care Review  Outcome: Ongoing (interventions implemented as appropriate)   08/14/19 2003   Coping/Psychosocial   Plan of Care Reviewed With patient   Plan of Care Review   Progress no change   OTHER   Outcome Summary Vitals as charted, pt is pleasantly confused, on RA, gtube in place and tube feeds as ordered, IV abx for UTI, c/o pain that was releived w/ prn pain meds., will continue to monitor.

## 2019-08-15 NOTE — PLAN OF CARE
Problem: Patient Care Overview  Goal: Plan of Care Review   08/15/19 0923   Coping/Psychosocial   Plan of Care Reviewed With patient   OTHER   Outcome Summary pt is an 87YO pleasantly confused elderly female, s/p fall at home, with multiple pelvic fractures, who presents to OT with generalized weakness, decreaesd endurance, decreased funcitonal mobility, decreased cognition resulting in decreased independence with ADLs. Pt perform supine to sit at EOB with Mod A, sit at EOB with CGA/Min A and vcs to maintain sitting balance, pt wash face with Min A and increased time/vcs. Pt req vcs to open eyes and to follow commands this session. Pt will benefit from skilled OT to address deficits and increase independence in ADLs. Pt benefit form additional therapy at SNF upon d/c.

## 2019-08-16 ENCOUNTER — APPOINTMENT (OUTPATIENT)
Dept: GENERAL RADIOLOGY | Facility: HOSPITAL | Age: 84
End: 2019-08-16

## 2019-08-16 VITALS
WEIGHT: 105.82 LBS | HEART RATE: 50 BPM | BODY MASS INDEX: 15.67 KG/M2 | RESPIRATION RATE: 14 BRPM | TEMPERATURE: 96.8 F | DIASTOLIC BLOOD PRESSURE: 68 MMHG | HEIGHT: 69 IN | OXYGEN SATURATION: 95 % | SYSTOLIC BLOOD PRESSURE: 168 MMHG

## 2019-08-16 LAB
ANION GAP SERPL CALCULATED.3IONS-SCNC: 13.3 MMOL/L (ref 5–15)
ANISOCYTOSIS BLD QL: ABNORMAL
BACTERIA SPEC AEROBE CULT: NO GROWTH
BUN BLD-MCNC: 18 MG/DL (ref 8–23)
BUN/CREAT SERPL: 25.4 (ref 7–25)
CALCIUM SPEC-SCNC: 8.3 MG/DL (ref 8.6–10.5)
CHLORIDE SERPL-SCNC: 98 MMOL/L (ref 98–107)
CO2 SERPL-SCNC: 18.7 MMOL/L (ref 22–29)
CREAT BLD-MCNC: 0.71 MG/DL (ref 0.57–1)
DEPRECATED RDW RBC AUTO: 63.6 FL (ref 37–54)
EOSINOPHIL # BLD MANUAL: 0.2 10*3/MM3 (ref 0–0.4)
EOSINOPHIL NFR BLD MANUAL: 2.1 % (ref 0.3–6.2)
ERYTHROCYTE [DISTWIDTH] IN BLOOD BY AUTOMATED COUNT: 16.1 % (ref 12.3–15.4)
GFR SERPL CREATININE-BSD FRML MDRD: 78 ML/MIN/1.73
GIANT PLATELETS: ABNORMAL
GLUCOSE BLD-MCNC: 115 MG/DL (ref 65–99)
HCT VFR BLD AUTO: 37.7 % (ref 34–46.6)
HGB BLD-MCNC: 11.5 G/DL (ref 12–15.9)
LYMPHOCYTES # BLD MANUAL: 0.59 10*3/MM3 (ref 0.7–3.1)
LYMPHOCYTES NFR BLD MANUAL: 5.3 % (ref 5–12)
LYMPHOCYTES NFR BLD MANUAL: 6.3 % (ref 19.6–45.3)
MACROCYTES BLD QL SMEAR: ABNORMAL
MCH RBC QN AUTO: 33.1 PG (ref 26.6–33)
MCHC RBC AUTO-ENTMCNC: 30.5 G/DL (ref 31.5–35.7)
MCV RBC AUTO: 108.6 FL (ref 79–97)
MONOCYTES # BLD AUTO: 0.49 10*3/MM3 (ref 0.1–0.9)
NEUTROPHILS # BLD AUTO: 8.03 10*3/MM3 (ref 1.7–7)
NEUTROPHILS NFR BLD MANUAL: 86.3 % (ref 42.7–76)
PLATELET # BLD AUTO: 352 10*3/MM3 (ref 140–450)
PMV BLD AUTO: 10 FL (ref 6–12)
POIKILOCYTOSIS BLD QL SMEAR: ABNORMAL
POLYCHROMASIA BLD QL SMEAR: ABNORMAL
POTASSIUM BLD-SCNC: 4.4 MMOL/L (ref 3.5–5.2)
RBC # BLD AUTO: 3.47 10*6/MM3 (ref 3.77–5.28)
SODIUM BLD-SCNC: 130 MMOL/L (ref 136–145)
WBC MORPH BLD: NORMAL
WBC NRBC COR # BLD: 9.31 10*3/MM3 (ref 3.4–10.8)

## 2019-08-16 PROCEDURE — 85007 BL SMEAR W/DIFF WBC COUNT: CPT | Performed by: HOSPITALIST

## 2019-08-16 PROCEDURE — 73030 X-RAY EXAM OF SHOULDER: CPT

## 2019-08-16 PROCEDURE — 3E0U33Z INTRODUCTION OF ANTI-INFLAMMATORY INTO JOINTS, PERCUTANEOUS APPROACH: ICD-10-PCS | Performed by: ORTHOPAEDIC SURGERY

## 2019-08-16 PROCEDURE — 85025 COMPLETE CBC W/AUTO DIFF WBC: CPT | Performed by: HOSPITALIST

## 2019-08-16 PROCEDURE — 80048 BASIC METABOLIC PNL TOTAL CA: CPT | Performed by: HOSPITALIST

## 2019-08-16 RX ORDER — METHYLPREDNISOLONE ACETATE 80 MG/ML
40 INJECTION, SUSPENSION INTRA-ARTICULAR; INTRALESIONAL; INTRAMUSCULAR; SOFT TISSUE ONCE
Status: DISCONTINUED | OUTPATIENT
Start: 2019-08-16 | End: 2019-08-16 | Stop reason: HOSPADM

## 2019-08-16 RX ORDER — LIDOCAINE HYDROCHLORIDE 10 MG/ML
5 INJECTION, SOLUTION EPIDURAL; INFILTRATION; INTRACAUDAL; PERINEURAL ONCE
Status: DISCONTINUED | OUTPATIENT
Start: 2019-08-16 | End: 2019-08-16 | Stop reason: HOSPADM

## 2019-08-16 RX ADMIN — OXYCODONE HYDROCHLORIDE 5 MG: 5 SOLUTION ORAL at 01:06

## 2019-08-16 RX ADMIN — OXYCODONE HYDROCHLORIDE 5 MG: 5 SOLUTION ORAL at 13:39

## 2019-08-16 RX ADMIN — FLUOXETINE HYDROCHLORIDE 20 MG: 20 CAPSULE ORAL at 08:53

## 2019-08-16 RX ADMIN — PANTOPRAZOLE SODIUM 40 MG: 40 TABLET, DELAYED RELEASE ORAL at 08:53

## 2019-08-16 RX ADMIN — LEVOTHYROXINE SODIUM 75 MCG: 75 TABLET ORAL at 08:53

## 2019-08-16 RX ADMIN — OXYCODONE HYDROCHLORIDE 5 MG: 5 SOLUTION ORAL at 08:53

## 2019-08-16 RX ADMIN — ASPIRIN 81 MG: 81 TABLET, COATED ORAL at 08:53

## 2019-08-16 NOTE — PLAN OF CARE
Problem: Fall Risk (Adult)  Goal: Absence of Fall  Outcome: Outcome(s) achieved Date Met: 08/16/19      Problem: Patient Care Overview  Goal: Plan of Care Review  Outcome: Outcome(s) achieved Date Met: 08/16/19 08/16/19 1524   Coping/Psychosocial   Plan of Care Reviewed With patient   Plan of Care Review   Progress improving   OTHER   Outcome Summary 88/F presenting with pelvlic fracture. ALOx self and place, pain controlled with oxycodone elixir 5 mg/5 mL. Purewick for voiding. IV removed per protocol. G-tube flushed with 30 cc free water q4h. D/C [facility] via EMS.     Goal: Individualization and Mutuality  Outcome: Outcome(s) achieved Date Met: 08/16/19    Goal: Discharge Needs Assessment  Outcome: Outcome(s) achieved Date Met: 08/16/19    Goal: Interprofessional Rounds/Family Conf  Outcome: Outcome(s) achieved Date Met: 08/16/19      Problem: Skin Injury Risk (Adult)  Goal: Skin Health and Integrity  Outcome: Outcome(s) achieved Date Met: 08/16/19      Problem: Pain, Chronic (Adult)  Goal: Acceptable Pain/Comfort Level and Functional Ability  Outcome: Outcome(s) achieved Date Met: 08/16/19      Problem: Nutrition, Enteral (Adult)  Goal: Signs and Symptoms of Listed Potential Problems Will be Absent, Minimized or Managed (Nutrition, Enteral)  Outcome: Ongoing (interventions implemented as appropriate)

## 2019-08-16 NOTE — PLAN OF CARE
Problem: Fall Risk (Adult)  Goal: Absence of Fall  Outcome: Ongoing (interventions implemented as appropriate)      Problem: Patient Care Overview  Goal: Plan of Care Review  Outcome: Ongoing (interventions implemented as appropriate)   08/16/19 0238   Coping/Psychosocial   Plan of Care Reviewed With patient   Plan of Care Review   Progress no change   OTHER   Outcome Summary CONTINUES WITH ENRIQUETA HEART RATE, TOLERATING PEG TUBE , GOOD OUTPUT PER [place]ICK, REPORTS ADEQUATE PAIN CONTROL , DISCUSSED MONITORING B/P DUE TO HX HYPERTENSION, SNU DISCHARGE PLANS PENDING     Goal: Individualization and Mutuality  Outcome: Ongoing (interventions implemented as appropriate)    Goal: Discharge Needs Assessment  Outcome: Ongoing (interventions implemented as appropriate)    Goal: Interprofessional Rounds/Family Conf  Outcome: Ongoing (interventions implemented as appropriate)      Problem: Skin Injury Risk (Adult)  Goal: Skin Health and Integrity  Outcome: Ongoing (interventions implemented as appropriate)      Problem: Pain, Chronic (Adult)  Goal: Acceptable Pain/Comfort Level and Functional Ability  Outcome: Ongoing (interventions implemented as appropriate)      Problem: Nutrition, Enteral (Adult)  Goal: Signs and Symptoms of Listed Potential Problems Will be Absent, Minimized or Managed (Nutrition, Enteral)  Outcome: Ongoing (interventions implemented as appropriate)

## 2019-08-16 NOTE — PROGRESS NOTES
Patient: Mary Lindsey  YOB: 1930     Date of Admission: 8/12/2019  8:33 PM Medical Record Number: [id number]     Attending Physician: Melecio Levi MD    Reason for consult: Pelvic Fractures and RIGHT trochanteric bursitis    Subjective : No new orthopaedic complaints     Pain Relief: some relief with present medication.     Systemic Complaints: She has mentioned some RIGHT shoulder pain this am during my assessment.  Vitals:    08/15/19 2011 08/15/19 2247 08/16/19 0345 08/16/19 0700   BP:  155/94 154/61 168/68   BP Location:  Left arm Left arm Left arm   Patient Position:  Lying Lying Lying   Pulse: 52 52 50 87   Resp: 14 14 14 14   Temp:  97 °F (36.1 °C) 97.1 °F (36.2 °C) 96.8 °F (36 °C)   TempSrc:  Oral Oral Oral   SpO2: 94% 95% 95% 95%   Weight:       Height:           Physical Exam: 88 y.o. female    General Appearance:       Alert, cooperative, in no acute distress                  Extremities:  Positive tenderness over the RIGHT greater trochanter   Compression of pelvis reproduced pain in the groin.   Attempted active movements of the   hip are painful and restricted. I am able to gently logroll the hip without significant pain.   The patient is able to do gentle active range of motion of her ankle and toes.   Gross sensation is intact over the toes.  Movements of the shoulder are painful and restricted. Negative pain with palpation. Her ROM is limited. Good movement of the fingers and elbow.   Pulses:   Pulses palpable and equal bilaterally           Diagnostic Tests:     Results from last 7 days   Lab Units 08/16/19  0401 08/15/19  0401 08/14/19  0318   WBC 10*3/mm3 9.31 13.10* 11.68*   HEMOGLOBIN g/dL 11.5* 10.5* 10.3*   HEMATOCRIT % 37.7 32.8* 32.0*   PLATELETS 10*3/mm3 352 360 335     Results from last 7 days   Lab Units 08/16/19  0401 08/15/19  0401 08/14/19  0318   SODIUM mmol/L 130* 131* 136   POTASSIUM mmol/L 4.4 4.2 4.2   CHLORIDE mmol/L 98 101 104   CO2 mmol/L 18.7* 21.6*  20.5*   BUN mg/dL 18 17 17   CREATININE mg/dL 0.71 0.73 0.76   GLUCOSE mg/dL 115* 92 107*   CALCIUM mg/dL 8.3* 8.3* 8.1*     Results from last 7 days   Lab Units 08/12/19  2209   INR  1.12*     No results found for: CRP  No results found for: SEDRATE  No results found for: URICACID  No results found for: CRYSTAL  Microbiology Results (last 10 days)     ** No results found for the last 240 hours. **        Ct Head Without Contrast    Result Date: 8/12/2019  1. No acute intracranial abnormality.      This report was finalized on 8/12/2019 10:36 PM by Shailesh Alfaro M.D.      Xr Chest 1 View    Result Date: 8/12/2019  Chronic-appearing parenchymal changes with some linear scar and/or atelectasis in the left lower lobe.  This report was finalized on 8/12/2019 9:48 PM by Shailesh Alfaro M.D.      Xr Hip With Or Without Pelvis 2 - 3 View Right    Result Date: 8/12/2019  Bipolar hip arthroplasties bilaterally. Cortical irregularity of the ischio pubic rami bilaterally are compatible with pelvic fractures although their exact age is not ascertained. These are new since 2006. There is no evidence of femur fracture.  This report was finalized on 8/12/2019 9:51 PM by Dr. Brendan Stewart M.D.              Current Medications:  Scheduled Meds:  amitriptyline 10 mg Per G Tube Nightly   aspirin 81 mg Oral Daily   ceftriaxone 1 g Intravenous Q24H   FLUoxetine 20 mg Per G Tube Daily   levothyroxine 75 mcg Per G Tube Daily   lidocaine 10 mL Infiltration Once   methylPREDNISolone acetate 40 mg Intra-articular Once   metoprolol tartrate 12.5 mg Per G Tube Q12H   pantoprazole 40 mg Oral Daily     Continuous Infusions:   PRN Meds:.•  albuterol  •  busPIRone  •  oxyCODONE  •  [COMPLETED] Insert peripheral IV **AND** sodium chloride    Assessment: Status post      Patient Active Problem List   Diagnosis   • Pelvic fracture (CMS/HCC)       PLAN:   Mobilize with PT as tolerated per protocol. She should stay with a walker.  I have offered an  injection to the RIGHT trochanteric bursa. The patient would like to proceed. Please see separate procedure note.  Follow up with Dr. Pinto in the office in 3-4 weeks for routine x-rays of the pelvis. We will also reassess her shoulder pain at that time.     Weight Bearing: WBAT  Discharge Plan: OK to plan for discharge from orthopadic perspective.    8/16 1500 **xrays of the RIGHT shoulder show possible chronic rotator cuff tear, mild glenoumeral arthritis and possible scapula fracture. Patient did not have scapular pain at the time of assessment. Patient has been discharged. I will call the discuss with nursing home. Non operative management indicated. Sling for comfort if needed. We will reevaluate at follow up.      ORLANDO Ochoa    Date: 8/16/2019    Time: 8:38 AM

## 2019-08-16 NOTE — PROGRESS NOTES
"Daily progress note    Chief complaint  Doing same  No new complaints  Family at bedside    History of present illness  88-year-old white female with history of COPD CHF coronary artery disease hypertension hyperlipidemia hypothyroidism also have dementia with severe osteoarthritis osteoporosis who was a nursing home resident brought to the emergency room after mechanical fall yesterday with bilateral hip pain and pelvic pain.  Patient stated that she has been falling a lot lately without losing consciousness but mainly she lost her balance and fell.  Patient denies any chest pain increased shortness of breath dizziness.  Patient hurting more on the left hip than the right.  Patient work-up in ER revealed multiple pelvic fractures admit for management     REVIEW OF SYSTEMS  Unable to obtain     PHYSICAL EXAM  Blood pressure 168/68, pulse 50, temperature 96.8 °F (36 °C), temperature source Oral, resp. rate 14, height 175.3 cm (69\"), weight 48 kg (105 lb 13.1 oz), SpO2 95 %.    GENERAL: Chronically ill-appearing, disheveled with multiple bruises on her extremities  HENT: NCAT, neck supple, trachea midline  EYES: no scleral icterus, PERRL, normal conjunctiva  CV: regular rhythm, regular rate, no murmur  RESPIRATORY: unlabored effort, CTAB  ABDOMEN: soft, non-tender, non-distended, bowel sounds present.  There is a G-tube present with no signs of infection or leakage.  MUSCULOSKELETAL: no gross deformity, TTP mild over R hip, slight pain with internal and external rotation.patient was unable to ambulate on her own even with a walker.  The pain in the right hip contributed to some unstable weightbearing and she had to be supported fully by one other person   nEURO: alert,  sensory and motor function of extremities grossly intact, speech clear, mental status normal/baseline. Pt is oriented but inaccurate with some answers and repeatative  SKIN: warm, dry, no rash, multiple bruising to the BLE.   PSYCH:  Appropriate mood " and affect    LAB RESULTS  Lab Results (last 24 hours)     Procedure Component Value Units Date/Time    Urine Culture - Urine, Urine, Random Void [[id number]]  (Normal) Collected:  08/15/19 1824    Specimen:  Urine, Random Void Updated:  08/16/19 1037     Urine Culture No growth    Basic Metabolic Panel [[id number]]  (Abnormal) Collected:  08/16/19 0401    Specimen:  Blood Updated:  08/16/19 0530     Glucose 115 mg/dL      BUN 18 mg/dL      Creatinine 0.71 mg/dL      Sodium 130 mmol/L      Potassium 4.4 mmol/L      Chloride 98 mmol/L      CO2 18.7 mmol/L      Calcium 8.3 mg/dL      eGFR Non African Amer 78 mL/min/1.73      BUN/Creatinine Ratio 25.4     Anion Gap 13.3 mmol/L     Narrative:       GFR Normal >60  Chronic Kidney Disease <60  Kidney Failure <15    CBC & Differential [[id number]] Collected:  08/16/19 0401    Specimen:  Blood Updated:  08/16/19 0506    Narrative:       The following orders were created for panel order CBC & Differential.  Procedure                               Abnormality         Status                     ---------                               -----------         ------                     CBC Auto Differential[[id number]]        Abnormal            Final result                 Please view results for these tests on the individual orders.    CBC Auto Differential [[id number]]  (Abnormal) Collected:  08/16/19 0401    Specimen:  Blood Updated:  08/16/19 0506     WBC 9.31 10*3/mm3      RBC 3.47 10*6/mm3      Hemoglobin 11.5 g/dL      Hematocrit 37.7 %      .6 fL      MCH 33.1 pg      MCHC 30.5 g/dL      RDW 16.1 %      RDW-SD 63.6 fl      MPV 10.0 fL      Platelets 352 10*3/mm3     Manual Differential [[id number]]  (Abnormal) Collected:  08/16/19 0401    Specimen:  Blood Updated:  08/16/19 0506     Neutrophil % 86.3 %      Lymphocyte % 6.3 %      Monocyte % 5.3 %      Eosinophil % 2.1 %      Neutrophils Absolute 8.03 10*3/mm3      Lymphocytes Absolute 0.59 10*3/mm3      Monocytes Absolute  0.49 10*3/mm3      Eosinophils Absolute 0.20 10*3/mm3      Anisocytosis Slight/1+     Macrocytes Slight/1+     Poikilocytes Slight/1+     Polychromasia Slight/1+     WBC Morphology Normal     Giant Platelets Slight/1+    Urinalysis, Microscopic Only - Urine, Random Void [[id number]]  (Abnormal) Collected:  08/15/19 1824    Specimen:  Urine, Random Void Updated:  08/15/19 1849     RBC, UA 0-2 /HPF      WBC, UA 6-12 /HPF      Bacteria, UA None Seen /HPF      Squamous Epithelial Cells, UA 0-2 /HPF      Hyaline Casts, UA None Seen /LPF      Methodology Automated Microscopy    Urinalysis With Culture If Indicated - Urine, Random Void [[id number]]  (Abnormal) Collected:  08/15/19 1824    Specimen:  Urine, Random Void Updated:  08/15/19 1844     Color, UA Yellow     Appearance, UA Clear     pH, UA 8.0     Specific Gravity, UA 1.016     Glucose, UA Negative     Ketones, UA Negative     Bilirubin, UA Negative     Blood, UA Negative     Protein, UA Negative     Leuk Esterase, UA Moderate (2+)     Nitrite, UA Negative     Urobilinogen, UA 1.0 E.U./dL        Imaging Results (last 24 hours)     Procedure Component Value Units Date/Time    XR Shoulder 2+ View Right [[id number]] Collected:  08/16/19 1125     Updated:  08/16/19 1133    Narrative:       RIGHT SHOULDER X-RAYS     CLINICAL HISTORY: Patient fell. Right shoulder pain.     Internal and external rotation views were obtained. The glenohumeral  articulation appears mildly narrowed. There is no bony erosion. There is  marked narrowing of the space between the acromion process and humeral  head consistent with a chronic rotator cuff tear. There is mild  deformity of the inferior tip of the scapula suspicious for an area of  acute fracture. Correlation with clinical findings is recommended.     This report was finalized on 8/16/2019 11:30 AM by Dr. Chip Barahona M.D.             Current Facility-Administered Medications:   •  albuterol (PROVENTIL) nebulizer solution 0.083% 2.5  mg/3mL, 2.5 mg, Nebulization, Q6H PRN, Melecio Levi MD  •  amitriptyline (ELAVIL) tablet 10 mg, 10 mg, Per G Tube, Nightly, Melecio Levi MD, 10 mg at 08/15/19 2029  •  aspirin EC tablet 81 mg, 81 mg, Oral, Daily, Melecio Levi MD, 81 mg at 08/16/19 0853  •  busPIRone (BUSPAR) tablet 10 mg, 10 mg, Per G Tube, TID PRN, Melecio Levi MD  •  cefTRIAXone (ROCEPHIN) IVPB 1 g, 1 g, Intravenous, Q24H, Melecio Levi MD, Stopped at 08/15/19 1613  •  FLUoxetine (PROzac) capsule 20 mg, 20 mg, Per G Tube, Daily, Melecio Levi MD, 20 mg at 08/16/19 0853  •  levothyroxine (SYNTHROID, LEVOTHROID) tablet 75 mcg, 75 mcg, Per G Tube, Daily, Melecio Levi MD, 75 mcg at 08/16/19 0853  •  lidocaine (XYLOCAINE) 1 % injection 10 mL, 10 mL, Infiltration, Once, Tashia Pinto MD  •  lidocaine PF 1% (XYLOCAINE) injection 5 mL, 5 mL, Injection, Once, Madai Worthy, APRN  •  methylPREDNISolone acetate (DEPO-medrol) injection 40 mg, 40 mg, Intra-articular, Once, Tashia Pinto MD  •  methylPREDNISolone acetate (DEPO-medrol) injection 40 mg, 40 mg, Intra-articular, Once, Madai Worthy, APRN  •  metoprolol tartrate (LOPRESSOR) tablet 12.5 mg, 12.5 mg, Per G Tube, Q12H, Melecio Levi MD  •  oxyCODONE (ROXICODONE) 5 MG/5ML solution 5 mg, 5 mg, Oral, Q4H PRN, Melecio Levi MD, 5 mg at 08/16/19 1339  •  pantoprazole (PROTONIX) EC tablet 40 mg, 40 mg, Oral, Daily, Melecio Levi MD, 40 mg at 08/16/19 0853  •  [COMPLETED] Insert peripheral IV, , , Once **AND** sodium chloride 0.9 % flush 10 mL, 10 mL, Intravenous, PRN, Darius Esteves MD     ASSESSMENT  Multiple pelvic fractures  Acute UTI  Status post fall  Right trochanteric bursitis status post steroid injection  Dysphagia status post G-tube on tube feeding at nighttime  COPD  Congestive heart failure  Coronary artery disease  Anxiety disorder  Depression  Dementia  Hypertension   Hyperlipidemia  Hypothyroidism  Osteoarthritis  Osteoporosis  Severe  malnutrition  Gastroesophageal reflux disease    PLAN  Discharge to subacute rehab  Discharge summary dictated    MITCHELL PRIETO MD

## 2019-08-16 NOTE — PROGRESS NOTES
Procedure Note    Mary Lindsey    Diagnosis:   RIGHT trochanteric bursitis    Anesthesia: Bed side procedure with local Lidocaine infiltration.     Staff: ORLANDO Max    Specimens:                NA.       Procedure:   Verbal consent was obtained. Allergies verified.   RIGHT hip was prepped and  using aseptic precautions.  Lidocaine 1% was infiltrated into the skin and subcutaneous tissue to achieve local analgesia.   Solution of 1cc Depo Medrol 40 mg and 4cc of Lidocaine 1% was injected into the RIGHT trochanteric bursa  Tolerated the procedure well, no complications. She experienced some relief of symptoms post injection.  A bandage was placed over the injection site.      ORLANDO Ochoa     Date: 8/16/2019  Time: 9:32 AM

## 2019-08-16 NOTE — DISCHARGE SUMMARY
Discharge summary     Date of admission8/12/2019  Date of discharge 8/16/2019    Final diagnosis  Multiple pelvic fractures  Acute UTI resolved  Status post fall  Right trochanteric bursitis status post steroid injection  Dysphagia status post G-tube on tube feeding at nighttime  COPD  Congestive heart failure  Coronary artery disease  Anxiety disorder  Depression  Dementia  Hypertension   Hyperlipidemia  Hypothyroidism  Osteoarthritis  Osteoporosis  Severe malnutrition  Gastroesophageal reflux disease    Discharge medications    Current Facility-Administered Medications:   •  albuterol (PROVENTIL) nebulizer solution 0.083% 2.5 mg/3mL, 2.5 mg, Nebulization, Q6H PRN, Melecio Levi MD  •  amitriptyline (ELAVIL) tablet 10 mg, 10 mg, Per G Tube, Nightly, Melecio Levi MD, 10 mg at 08/15/19 2029  •  aspirin EC tablet 81 mg, 81 mg, Oral, Daily, Melecio Levi MD, 81 mg at 08/16/19 0853  •  busPIRone (BUSPAR) tablet 10 mg, 10 mg, Per G Tube, TID PRN, Melecio Levi MD  •  FLUoxetine (PROzac) capsule 20 mg, 20 mg, Per G Tube, Daily, Melecio Levi MD, 20 mg at 08/16/19 0853  •  levothyroxine (SYNTHROID, LEVOTHROID) tablet 75 mcg, 75 mcg, Per G Tube, Daily, Melecio Levi MD, 75 mcg at 08/16/19 0853  •  lidocaine (XYLOCAINE) 1 % injection 10 mL, 10 mL, Infiltration, Once, Tashia Pinto MD  •  lidocaine PF 1% (XYLOCAINE) injection 5 mL, 5 mL, Injection, Once, Madai Worthy APRN  •  methylPREDNISolone acetate (DEPO-medrol) injection 40 mg, 40 mg, Intra-articular, Once, Tashia Pinto MD  •  methylPREDNISolone acetate (DEPO-medrol) injection 40 mg, 40 mg, Intra-articular, Once, Madai Worthy APRN  •  metoprolol tartrate (LOPRESSOR) tablet 12.5 mg, 12.5 mg, Per G Tube, Q12H, Melecio Levi MD  •  oxyCODONE (ROXICODONE) 5 MG/5ML solution 5 mg, 5 mg, Oral, Q4H PRN, Melecio Levi MD, 5 mg at 08/16/19 5226  •  pantoprazole (PROTONIX) EC tablet 40 mg, 40 mg, Oral, Daily, Melecio Levi MD, 40 mg at 08/16/19  0853  •  [COMPLETED] Insert peripheral IV, , , Once **AND** sodium chloride 0.9 % flush 10 mL, 10 mL, Intravenous, PRN, Darius Esteves MD     Consults obtained  Orthopedic surgery  Nutrition    Procedures  Status post right hip steroid injection    Hospital course  88-year-old white female with history of COPD CHF coronary artery disease hypertension hyperlipidemia hypothyroidism and osteoarthritis who also has a G-tube placement secondary to dysphagia and malnutrition admitted through emergency room after the fall with bilateral hip pain and pelvic pain.  Patient work-up revealed multiple pelvic fracture and right  intertrochanteric bursitis admit for management.  Patient admitted for pain control with medications and orthopedic surgery consult obtained they offered steroid injection for which she received.  Patient is not a surgical candidate and no surgery indicated.  Patient remain awake and alert and family requested that we should change tube feeding to nocturnal only and letter eating daytime which we accepted and agree.  Patient eating soft diet and getting nocturnal feeding from 6 PM to 6 AM.  Patient remained DNR throughout hospital course and cleared from orthopedic to be discharged per PT OT nursing care.  Patient has mild UTI and completed 3 days of IV antibiotics.    Discharge diet soft and tube feeding 6PM to 6AM    Activity per PT OT    Medication as above    Further care per accepting physician at nursing home    MITCHELL PRIETO MD

## 2019-08-16 NOTE — PROGRESS NOTES
Continued Stay Note  [facility]     Patient Name: Mary Lindsey  MRN: [id number]  Today's Date: 8/16/2019    Admit Date: 8/12/2019    Discharge Plan     Row Name 08/16/19 1148       Plan    Final Discharge Disposition Code  03 - skilled nursing facility (SNF)    Final Note  Pt to d/c to [facility]. Pt will transport to facility via [facility] at 2:30pm. Pts family agreeable with d/c plan. No other needs identified.        Discharge Codes    No documentation.             Mckayla Foss RN

## 2019-08-16 NOTE — NURSING NOTE
Spoke with Sabine at [facility] regarding patient transfer to the facility this afternoon.  Full report given and any and all questions answered to the satisfaction of the staff involved.  Call back number given in case any new questions arise.    Patient is to be transported via EMS to facility at approximately [phone] this afternoon.  Family informed.  Patient VSS with no signs or symptoms of acute complications at this time.  Will proceed with discharge process.

## 2019-08-16 NOTE — PLAN OF CARE
Problem: Nutrition, Enteral (Adult)  Intervention: Monitor/Manage Nutrition Support  Follow up-TF were changed by MD to nocturnal. Recommend Isosource 1.5 goal rate @ 70 cc/hr x 12 hours to better meet patient's nutritional needs.